# Patient Record
Sex: FEMALE | Race: OTHER | HISPANIC OR LATINO
[De-identification: names, ages, dates, MRNs, and addresses within clinical notes are randomized per-mention and may not be internally consistent; named-entity substitution may affect disease eponyms.]

---

## 2021-06-09 PROBLEM — Z00.00 ENCOUNTER FOR PREVENTIVE HEALTH EXAMINATION: Status: ACTIVE | Noted: 2021-06-09

## 2021-06-10 ENCOUNTER — ASOB RESULT (OUTPATIENT)
Age: 26
End: 2021-06-10

## 2021-06-10 ENCOUNTER — APPOINTMENT (OUTPATIENT)
Dept: ANTEPARTUM | Facility: CLINIC | Age: 26
End: 2021-06-10
Payer: MEDICAID

## 2021-06-10 PROCEDURE — 76815 OB US LIMITED FETUS(S): CPT

## 2021-07-22 ENCOUNTER — ASOB RESULT (OUTPATIENT)
Age: 26
End: 2021-07-22

## 2021-07-22 ENCOUNTER — APPOINTMENT (OUTPATIENT)
Dept: ANTEPARTUM | Facility: CLINIC | Age: 26
End: 2021-07-22

## 2021-08-17 ENCOUNTER — APPOINTMENT (OUTPATIENT)
Dept: ANTEPARTUM | Facility: CLINIC | Age: 26
End: 2021-08-17

## 2021-09-30 ENCOUNTER — APPOINTMENT (OUTPATIENT)
Dept: ANTEPARTUM | Facility: CLINIC | Age: 26
End: 2021-09-30
Payer: MEDICAID

## 2021-09-30 ENCOUNTER — ASOB RESULT (OUTPATIENT)
Age: 26
End: 2021-09-30

## 2021-09-30 PROBLEM — Z00.00 ENCOUNTER FOR PREVENTIVE HEALTH EXAMINATION: Noted: 2021-09-30

## 2021-09-30 PROCEDURE — 76819 FETAL BIOPHYS PROFIL W/O NST: CPT

## 2021-09-30 PROCEDURE — 76816 OB US FOLLOW-UP PER FETUS: CPT

## 2021-11-01 ENCOUNTER — OUTPATIENT (OUTPATIENT)
Dept: INPATIENT UNIT | Facility: HOSPITAL | Age: 26
LOS: 1 days | End: 2021-11-01
Payer: COMMERCIAL

## 2021-11-01 VITALS
RESPIRATION RATE: 16 BRPM | SYSTOLIC BLOOD PRESSURE: 104 MMHG | DIASTOLIC BLOOD PRESSURE: 60 MMHG | TEMPERATURE: 99 F | HEART RATE: 67 BPM

## 2021-11-01 VITALS — DIASTOLIC BLOOD PRESSURE: 55 MMHG | SYSTOLIC BLOOD PRESSURE: 98 MMHG | HEART RATE: 71 BPM

## 2021-11-01 DIAGNOSIS — O47.1 FALSE LABOR AT OR AFTER 37 COMPLETED WEEKS OF GESTATION: ICD-10-CM

## 2021-11-01 PROCEDURE — 59025 FETAL NON-STRESS TEST: CPT

## 2021-11-01 PROCEDURE — G0463: CPT

## 2021-11-01 RX ORDER — CITRIC ACID/SODIUM CITRATE 300-500 MG
30 SOLUTION, ORAL ORAL ONCE
Refills: 0 | Status: COMPLETED | OUTPATIENT
Start: 2021-11-01 | End: 2021-11-01

## 2021-11-01 RX ADMIN — Medication 30 MILLILITER(S): at 21:00

## 2021-11-01 NOTE — OB PROVIDER TRIAGE NOTE - NSHPPHYSICALEXAM_GEN_ALL_CORE
T(C): 37.1 (11-01-21 @ 19:47), Max: 37.1 (11-01-21 @ 19:47)  HR: 67 (11-01-21 @ 19:50) (67 - 67)  BP: 104/60 (11-01-21 @ 19:50) (104/60 - 104/60)  RR: 16 (11-01-21 @ 19:47) (16 - 16)    Gen: NAD, well-appearing, AAOx3   Abd: Soft, gravid  Ext: non-tender, non-edematous  SSE:   SVE:    Bedside sono:  FHT:  [place]: T(C): 37.1 (11-01-21 @ 19:47), Max: 37.1 (11-01-21 @ 19:47)  HR: 67 (11-01-21 @ 19:50) (67 - 67)  BP: 104/60 (11-01-21 @ 19:50) (104/60 - 104/60)  RR: 16 (11-01-21 @ 19:47) (16 - 16)    Gen: NAD, well-appearing, AAOx3   Abd: Soft, gravid  Ext: non-tender, non-edematous    SVE:  3/50/-3    FHT: baseline 140s, moderate variability, +accels, -decels   [place]: CTX irregularly

## 2021-11-01 NOTE — OB PROVIDER TRIAGE NOTE - NSOBPROVIDERNOTE_OBGYN_ALL_OB_FT
A/P: 26y  at 39 weeks 4 days GA by LMP who presents to L&D for 8/10 right upper quadrant and back pain.   -F/u UA and culture  -Fetus: Cat I tracing. Continuous toco and fetal monitoring.   -GBS: Positive    Discussed with  _ A/P: 26y  at 39 weeks 4 days GA by LMP who presents to L&D for RUQ pain.  -VSS  -Fetus: reactive  -CTX irregularly  -SVE: 3/50/-3. Will reexamine in 2 hours  -Bicitra  -GBS: Positive    Discussed with Dr. Roman

## 2021-11-01 NOTE — OB PROVIDER TRIAGE NOTE - HISTORY OF PRESENT ILLNESS
used for HPI.    26y  at 39 weeks 4 days GA by LMP who presents to L&D for 8/10 right upper quadrant and back pain. Patient states pain began this morning and has progressively gotten worse. The pain is associated with a strong urge to urinate, but when she goes to urinate she only produces a small amount of yellow urine. Patient denies vaginal bleeding, contractions and leakage of fluid. She does say she has some white and thick vaginal discharge. She endorses good fetal movement. Denies fevers, chills, nausea, vomiting, chest pain, SOB, dizziness and headache. No other complaints at this time.   VLAD: 21  Prenatal course uncomplicated.      POB: ;   G1:  at term 2018  G2:  at term 2019  PGYN: denies  PMH: Denies  PSH: Denies  SH: Denies EtOH, tobacco and illicit drug use during this pregnancy  Meds: PNVs  Allergies: NKDA  used for HPI.    26y  at 39 weeks 4 days GA by LMP who presents to L&D for 8/10 right upper quadrant and back pain. Patient states pain began this morning and has progressively gotten worse. The pain is associated with a strong urge to urinate, but when she goes to urinate she only produces a small amount of yellow urine. Patient denies vaginal bleeding, contractions and leakage of fluid. She does say she has some white and thick vaginal discharge. She endorses good fetal movement. Denies fevers, chills, dizziness and headache. Says she threw up once this morning. No other complaints at this time.   VLAD: 21  Prenatal course uncomplicated.      POB: ;   G1:  at term 2018  G2:  at term 2019  PGYN: denies  PMH: Denies  PSH: Denies  SH: Denies EtOH, tobacco and illicit drug use during this pregnancy  Meds: PNVs  Allergies: NKDA  used for HPI.    26y  at 39 weeks 4 days GA by LMP who presents to L&D for right upper quadrant and back pain. Patient rates the pain as 8/10 and states pain began this morning and has progressively gotten worse. She has not taken anything for the pain. Patient ate a fruit smoothie this morning, had an episode of emesis around noon and has not eaten since. Patient denies vaginal bleeding, contractions and leakage of fluid. She endorses good fetal movement. Denies fevers, chills, dizziness and headache. No other complaints at this time.     VLAD: 21    Prenatal course uncomplicated.      POB:   G1:  at term 2018  G2:  at term 2019  G3: current  PGYN: denies  PMH: Denies  PSH: Denies  SH: Denies EtOH, tobacco and illicit drug use during this pregnancy  Meds: PNVs  Allergies: NKDA

## 2021-11-05 RX ORDER — OXYTOCIN 10 UNIT/ML
333.33 VIAL (ML) INJECTION
Qty: 20 | Refills: 0 | Status: DISCONTINUED | OUTPATIENT
Start: 2021-11-06 | End: 2021-11-09

## 2021-11-05 RX ORDER — CITRIC ACID/SODIUM CITRATE 300-500 MG
30 SOLUTION, ORAL ORAL ONCE
Refills: 0 | Status: COMPLETED | OUTPATIENT
Start: 2021-11-06 | End: 2021-11-06

## 2021-11-05 RX ORDER — AMPICILLIN TRIHYDRATE 250 MG
1 CAPSULE ORAL EVERY 4 HOURS
Refills: 0 | Status: DISCONTINUED | OUTPATIENT
Start: 2021-11-06 | End: 2021-11-07

## 2021-11-05 RX ORDER — SODIUM CHLORIDE 9 MG/ML
1000 INJECTION, SOLUTION INTRAVENOUS
Refills: 0 | Status: DISCONTINUED | OUTPATIENT
Start: 2021-11-06 | End: 2021-11-07

## 2021-11-05 NOTE — OB PROVIDER H&P - ASSESSMENT
Patient is a 26 year old  at 40w2d by LMP who presents to L&D for IOL at term.   A/P: Patient is a 26 year old  at 40w2d by LMP who presents to L&D for IOL at term.    -Admit to L&D  -Consent  -Admission labs  -NPO, except ice chips   -IV fluids  -Labor: Intact. Not in labor. Will start induction of labor with pitocin.   -Fetus: Reactive NST. Continuous toco and fetal monitoring.   -GBS: Positive, GBS ppx required   -Analgesia: Will desire an epidural   -DVT ppx: Ambulate and SCD's while in bed   -Male fetus, declines circumcision     Discussed with Dr. Castillo

## 2021-11-05 NOTE — OB PROVIDER H&P - HISTORY OF PRESENT ILLNESS
Patient is a 26 year old  at 40w2d by LMP who presents to L&D for IOL at term.      VLAD: 21   LMP: 21      Pregnancy course:   Insufficient prenatal care   Breech presentation - resolved      Obhx:   G1 – 18 -  @ 40 wks, [id number]   G2 – 19 -  @ 40wks, [id number]   Gynhx: -fibroids, -cysts, -STIs, no abnormal PAPs   Pmhx: denies   Pshx: denies   Meds: PNV   Allergies: NKDA   SocialHx: denies smoking, drug or alcohol use       BMI: 29.68   Ultrasound: vertex, anterior on 21   EFW: 2563g on 21  Patient is a 26 year old  at 40w2d by LMP who presents to L&D for IOL at term. Patient denies vaginal bleeding, contractions and leakage of fluid. She endorses good fetal movement. Denies fevers, chills, nausea and vomiting. No other complaints at this time.   VLAD: 21   LMP: 21     Pregnancy course:   Insufficient prenatal care     Obhx:   G1 – 18 -  @ 40 wks, [id number]   G2 – 19 -  @ 40wks, [id number]   Gynhx: -fibroids, -cysts, -STIs, no abnormal PAPs   Pmhx: denies   Pshx: denies   Meds: PNV   Allergies: NKDA   SocialHx: denies smoking, drug or alcohol use       BMI: 29.68   Ultrasound: vertex, anterior on 21   EFW: 2563g on 21

## 2021-11-05 NOTE — OB PROVIDER H&P - ATTENDING COMMENTS
Message left that clinic called and patient needs to call the clinic ASAP.       Multip with IUP @ 19hah6d admitted for elective IOL at term, however already julee and in early labor. GBSpos. Maternal/fetal status reassuring    -admit  -routine admission orders  -AMP for GBS prophylaxis   -pitocin as gutierrez score is 6  -regular diet  -consents explained and signed  all questions and concerns answered  ppalos

## 2021-11-05 NOTE — OB PROVIDER H&P - NSHPPHYSICALEXAM_GEN_ALL_CORE
Vital Signs Last 24 Hrs  T(C): 36.7 (06 Nov 2021 09:05), Max: 36.7 (06 Nov 2021 09:05)  T(F): 98.1 (06 Nov 2021 09:05), Max: 98.1 (06 Nov 2021 09:05)  HR: 67 (06 Nov 2021 09:05) (67 - 67)  BP: 98/54 (06 Nov 2021 09:05) (98/54 - 98/54)  RR: 18 (06 Nov 2021 09:05) (18 - 18)    Gen: NAD  Abd: soft, non-tender, gravid   Ext: non-tender, non-edematous   SVE: 3.5/40/-3  Bedside sono: cephalic  FHT: baseline 140s, moderate variability, +accels, -decels   [place]: irregular contractions

## 2021-11-06 ENCOUNTER — INPATIENT (INPATIENT)
Facility: HOSPITAL | Age: 26
LOS: 2 days | Discharge: HOME | End: 2021-11-09
Attending: OBSTETRICS & GYNECOLOGY | Admitting: OBSTETRICS & GYNECOLOGY
Payer: COMMERCIAL

## 2021-11-06 ENCOUNTER — RESULT REVIEW (OUTPATIENT)
Age: 26
End: 2021-11-06

## 2021-11-06 VITALS — SYSTOLIC BLOOD PRESSURE: 98 MMHG | DIASTOLIC BLOOD PRESSURE: 54 MMHG | HEART RATE: 67 BPM

## 2021-11-06 LAB
ABO RH CONFIRMATION: SIGNIFICANT CHANGE UP
BASOPHILS # BLD AUTO: 0.03 K/UL — SIGNIFICANT CHANGE UP (ref 0–0.2)
BASOPHILS NFR BLD AUTO: 0.4 % — SIGNIFICANT CHANGE UP (ref 0–2)
BLD GP AB SCN SERPL QL: SIGNIFICANT CHANGE UP
COVID-19 SPIKE DOMAIN AB INTERP: POSITIVE
COVID-19 SPIKE DOMAIN ANTIBODY RESULT: 146 U/ML — HIGH
EOSINOPHIL # BLD AUTO: 0.05 K/UL — SIGNIFICANT CHANGE UP (ref 0–0.5)
EOSINOPHIL NFR BLD AUTO: 0.6 % — SIGNIFICANT CHANGE UP (ref 0–6)
HCT VFR BLD CALC: 33.1 % — LOW (ref 34.5–45)
HGB BLD-MCNC: 10.9 G/DL — LOW (ref 11.5–15.5)
IMM GRANULOCYTES NFR BLD AUTO: 1.7 % — HIGH (ref 0–1.5)
LYMPHOCYTES # BLD AUTO: 1.48 K/UL — SIGNIFICANT CHANGE UP (ref 1–3.3)
LYMPHOCYTES # BLD AUTO: 17.9 % — SIGNIFICANT CHANGE UP (ref 13–44)
MCHC RBC-ENTMCNC: 27.3 PG — SIGNIFICANT CHANGE UP (ref 27–34)
MCHC RBC-ENTMCNC: 32.9 GM/DL — SIGNIFICANT CHANGE UP (ref 32–36)
MCV RBC AUTO: 82.8 FL — SIGNIFICANT CHANGE UP (ref 80–100)
MONOCYTES # BLD AUTO: 0.5 K/UL — SIGNIFICANT CHANGE UP (ref 0–0.9)
MONOCYTES NFR BLD AUTO: 6.1 % — SIGNIFICANT CHANGE UP (ref 2–14)
NEUTROPHILS # BLD AUTO: 6.06 K/UL — SIGNIFICANT CHANGE UP (ref 1.8–7.4)
NEUTROPHILS NFR BLD AUTO: 73.3 % — SIGNIFICANT CHANGE UP (ref 43–77)
PLATELET # BLD AUTO: 208 K/UL — SIGNIFICANT CHANGE UP (ref 150–400)
RBC # BLD: 4 M/UL — SIGNIFICANT CHANGE UP (ref 3.8–5.2)
RBC # FLD: 13.6 % — SIGNIFICANT CHANGE UP (ref 10.3–14.5)
SARS-COV-2 IGG+IGM SERPL QL IA: 146 U/ML — HIGH
SARS-COV-2 IGG+IGM SERPL QL IA: POSITIVE
WBC # BLD: 8.26 K/UL — SIGNIFICANT CHANGE UP (ref 3.8–10.5)
WBC # FLD AUTO: 8.26 K/UL — SIGNIFICANT CHANGE UP (ref 3.8–10.5)

## 2021-11-06 RX ORDER — GENTAMICIN SULFATE 40 MG/ML
260 VIAL (ML) INJECTION EVERY 24 HOURS
Refills: 0 | Status: DISCONTINUED | OUTPATIENT
Start: 2021-11-06 | End: 2021-11-07

## 2021-11-06 RX ORDER — ACETAMINOPHEN 500 MG
1000 TABLET ORAL ONCE
Refills: 0 | Status: COMPLETED | OUTPATIENT
Start: 2021-11-06 | End: 2021-11-06

## 2021-11-06 RX ORDER — OXYTOCIN 10 UNIT/ML
2 VIAL (ML) INJECTION
Qty: 30 | Refills: 0 | Status: DISCONTINUED | OUTPATIENT
Start: 2021-11-06 | End: 2021-11-09

## 2021-11-06 RX ORDER — AMPICILLIN TRIHYDRATE 250 MG
2 CAPSULE ORAL EVERY 6 HOURS
Refills: 0 | Status: DISCONTINUED | OUTPATIENT
Start: 2021-11-06 | End: 2021-11-07

## 2021-11-06 RX ORDER — AMPICILLIN TRIHYDRATE 250 MG
2 CAPSULE ORAL ONCE
Refills: 0 | Status: COMPLETED | OUTPATIENT
Start: 2021-11-06 | End: 2021-11-06

## 2021-11-06 RX ORDER — ACETAMINOPHEN 500 MG
975 TABLET ORAL ONCE
Refills: 0 | Status: COMPLETED | OUTPATIENT
Start: 2021-11-06 | End: 2021-11-06

## 2021-11-06 RX ADMIN — Medication 975 MILLIGRAM(S): at 18:50

## 2021-11-06 RX ADMIN — Medication 108 GRAM(S): at 22:04

## 2021-11-06 RX ADMIN — Medication 108 GRAM(S): at 18:07

## 2021-11-06 RX ADMIN — Medication 108 GRAM(S): at 14:06

## 2021-11-06 RX ADMIN — Medication 216 GRAM(S): at 10:33

## 2021-11-06 RX ADMIN — Medication 2 MILLIUNIT(S)/MIN: at 11:09

## 2021-11-06 RX ADMIN — Medication 975 MILLIGRAM(S): at 17:50

## 2021-11-06 RX ADMIN — Medication 400 MILLIGRAM(S): at 23:45

## 2021-11-06 RX ADMIN — Medication 30 MILLILITER(S): at 16:16

## 2021-11-06 RX ADMIN — SODIUM CHLORIDE 125 MILLILITER(S): 9 INJECTION, SOLUTION INTRAVENOUS at 09:40

## 2021-11-06 NOTE — OB PROVIDER LABOR PROGRESS NOTE - ASSESSMENT
Patient admitted for elective induction of labor. Patient now with suspected chorioamnionitis given fever of 101.5F and malodorous mucopurulent discharge. Ampicillin increased to 2g, gentamicin and ofirmev ordered. Patient does not meet criteria for code sepsis. Cat 1 tracing. Continue pitocin.     D/W Dr. East 
Patient admitted for an elective induction of labor.   -VSS  -Cat 1 tracing   -Eliezer regularly   -Continue pitocin

## 2021-11-06 NOTE — OB PROVIDER LABOR PROGRESS NOTE - NS_SUBJECTIVE/OBJECTIVE_OBGYN_ALL_OB_FT
Patient seen and examined at bedside for fever of 101.5F.   Vital Signs Last 24 Hrs  T(C): 37.5 (06 Nov 2021 21:45), Max: 37.5 (06 Nov 2021 21:45)  T(F): 99.5 (06 Nov 2021 21:45), Max: 99.5 (06 Nov 2021 21:45)  HR: 90 (06 Nov 2021 23:35) (62 - 102)  BP: 107/54 (06 Nov 2021 23:34) (89/53 - 113/62)  RR: 14 (06 Nov 2021 21:45) (14 - 18)  SpO2: 98% (06 Nov 2021 23:35) (94% - 99%)

## 2021-11-06 NOTE — OB PROVIDER LABOR PROGRESS NOTE - NS_SUBJECTIVE/OBJECTIVE_OBGYN_ALL_OB_FT
Patient is having elective IOL  FHR: baseline 150, mod variability, +accels, -deccels  [place]: contractions q2-3 minutes    AROM, clear fluid    Plan  - will continue pitocin  d/w Dr. Castillo

## 2021-11-06 NOTE — OB PROVIDER LABOR PROGRESS NOTE - NS_OBIHIFHRDETAILS_OBGYN_ALL_OB_FT
baseline 150s, moderate variability, +accels, -decels
baseline 150s, moderate variability, +accels, -decels

## 2021-11-06 NOTE — OB RN DELIVERY SUMMARY - NSSELHIDDEN_OBGYN_ALL_OB_FT
[NS_DeliveryAttending1_OBGYN_ALL_OB_FT:[id number]],[NS_DeliveryAssist1_OBGYN_ALL_OB_FT:[id number]],[NS_DeliveryRN_OBGYN_ALL_OB_FT:[id number]]

## 2021-11-06 NOTE — OB RN DELIVERY SUMMARY - NS_SEPSISRSKCALC_OBGYN_ALL_OB_FT
EOS calculated successfully. EOS Risk Factor: 0.5/1000 live births ([facility] national incidence); GA=40w3d; Temp=99.5; ROM=8.433; GBS='Positive'; Antibiotics='GBS specific antibiotics > 2 hrs prior to birth'   EOS calculated successfully. EOS Risk Factor: 0.5/1000 live births ([facility] national incidence); GA=40w3d; Temp=101.5; ROM=8.433; GBS='Positive'; Antibiotics='GBS specific antibiotics > 2 hrs prior to birth'   EOS calculated successfully. EOS Risk Factor: 0.5/1000 live births ([facility] national incidence); GA=40w3d; Temp=101.5; ROM=8.433; GBS='Positive'; Antibiotics='Broad spectrum antibiotics > 4 hrs prior to birth'

## 2021-11-06 NOTE — OB PROVIDER LABOR PROGRESS NOTE - NS_SUBJECTIVE/OBJECTIVE_OBGYN_ALL_OB_FT
Patient seen and examined at bedside. She is doing well. No complaints at this time.   Vital Signs Last 24 Hrs  T(C): 37.1 (06 Nov 2021 20:33), Max: 37.2 (06 Nov 2021 19:33)  T(F): 98.8 (06 Nov 2021 20:33), Max: 99 (06 Nov 2021 19:33)  HR: 71 (06 Nov 2021 21:40) (62 - 93)  BP: 97/54 (06 Nov 2021 21:34) (89/53 - 109/59)  RR: 18 (06 Nov 2021 18:45) (17 - 18)  SpO2: 97% (06 Nov 2021 21:20) (94% - 98%)

## 2021-11-06 NOTE — OB PROVIDER LABOR PROGRESS NOTE - NS_SUBJECTIVE/OBJECTIVE_OBGYN_ALL_OB_FT
Patient is having an elective IOL  FHR: baseline 150bpm, mod variability, +accels, -deccels  [place]: contractions q 5 minutes    Plan  - will continue pitocin    d/w Dr. Castillo Patient is having an elective IOL  FHR: baseline 150bpm, mod variability, +accels, -deccels  [address] [place]: contractions q 5 minutes    Plan  - will continue pitocin    d/w Dr. Castillo    maternal/fetal status reassuring  ppalos

## 2021-11-07 ENCOUNTER — TRANSCRIPTION ENCOUNTER (OUTPATIENT)
Age: 26
End: 2021-11-07

## 2021-11-07 LAB
HCT VFR BLD CALC: 33.5 % — LOW (ref 34.5–45)
HGB BLD-MCNC: 10.8 G/DL — LOW (ref 11.5–15.5)
MCHC RBC-ENTMCNC: 26.9 PG — LOW (ref 27–34)
MCHC RBC-ENTMCNC: 32.2 GM/DL — SIGNIFICANT CHANGE UP (ref 32–36)
MCV RBC AUTO: 83.3 FL — SIGNIFICANT CHANGE UP (ref 80–100)
MEV IGG SER-ACNC: 21 AU/ML — SIGNIFICANT CHANGE UP
MEV IGG+IGM SER-IMP: POSITIVE — SIGNIFICANT CHANGE UP
PLATELET # BLD AUTO: 196 K/UL — SIGNIFICANT CHANGE UP (ref 150–400)
RBC # BLD: 4.02 M/UL — SIGNIFICANT CHANGE UP (ref 3.8–5.2)
RBC # FLD: 13.8 % — SIGNIFICANT CHANGE UP (ref 10.3–14.5)
T PALLIDUM AB TITR SER: NEGATIVE — SIGNIFICANT CHANGE UP
WBC # BLD: 21.89 K/UL — HIGH (ref 3.8–10.5)
WBC # FLD AUTO: 21.89 K/UL — HIGH (ref 3.8–10.5)

## 2021-11-07 PROCEDURE — 88307 TISSUE EXAM BY PATHOLOGIST: CPT | Mod: 26

## 2021-11-07 RX ORDER — BENZOCAINE 10 %
1 GEL (GRAM) MUCOUS MEMBRANE EVERY 6 HOURS
Refills: 0 | Status: DISCONTINUED | OUTPATIENT
Start: 2021-11-07 | End: 2021-11-09

## 2021-11-07 RX ORDER — OXYTOCIN 10 UNIT/ML
333.33 VIAL (ML) INJECTION
Qty: 20 | Refills: 0 | Status: DISCONTINUED | OUTPATIENT
Start: 2021-11-07 | End: 2021-11-09

## 2021-11-07 RX ORDER — SODIUM CHLORIDE 9 MG/ML
1000 INJECTION, SOLUTION INTRAVENOUS ONCE
Refills: 0 | Status: COMPLETED | OUTPATIENT
Start: 2021-11-07 | End: 2021-11-07

## 2021-11-07 RX ORDER — ACETAMINOPHEN 500 MG
975 TABLET ORAL
Refills: 0 | Status: DISCONTINUED | OUTPATIENT
Start: 2021-11-07 | End: 2021-11-09

## 2021-11-07 RX ORDER — LANOLIN
1 OINTMENT (GRAM) TOPICAL EVERY 6 HOURS
Refills: 0 | Status: DISCONTINUED | OUTPATIENT
Start: 2021-11-07 | End: 2021-11-09

## 2021-11-07 RX ORDER — SIMETHICONE 80 MG/1
80 TABLET, CHEWABLE ORAL EVERY 4 HOURS
Refills: 0 | Status: DISCONTINUED | OUTPATIENT
Start: 2021-11-07 | End: 2021-11-09

## 2021-11-07 RX ORDER — SODIUM CHLORIDE 9 MG/ML
3 INJECTION INTRAMUSCULAR; INTRAVENOUS; SUBCUTANEOUS EVERY 8 HOURS
Refills: 0 | Status: DISCONTINUED | OUTPATIENT
Start: 2021-11-07 | End: 2021-11-09

## 2021-11-07 RX ORDER — OXYCODONE HYDROCHLORIDE 5 MG/1
5 TABLET ORAL
Refills: 0 | Status: DISCONTINUED | OUTPATIENT
Start: 2021-11-07 | End: 2021-11-09

## 2021-11-07 RX ORDER — HYDROCORTISONE 1 %
1 OINTMENT (GRAM) TOPICAL EVERY 6 HOURS
Refills: 0 | Status: DISCONTINUED | OUTPATIENT
Start: 2021-11-07 | End: 2021-11-09

## 2021-11-07 RX ORDER — OXYCODONE HYDROCHLORIDE 5 MG/1
5 TABLET ORAL ONCE
Refills: 0 | Status: DISCONTINUED | OUTPATIENT
Start: 2021-11-07 | End: 2021-11-09

## 2021-11-07 RX ORDER — DIBUCAINE 1 %
1 OINTMENT (GRAM) RECTAL EVERY 6 HOURS
Refills: 0 | Status: DISCONTINUED | OUTPATIENT
Start: 2021-11-07 | End: 2021-11-09

## 2021-11-07 RX ORDER — PRAMOXINE HYDROCHLORIDE 150 MG/15G
1 AEROSOL, FOAM RECTAL EVERY 4 HOURS
Refills: 0 | Status: DISCONTINUED | OUTPATIENT
Start: 2021-11-07 | End: 2021-11-09

## 2021-11-07 RX ORDER — KETOROLAC TROMETHAMINE 30 MG/ML
30 SYRINGE (ML) INJECTION ONCE
Refills: 0 | Status: DISCONTINUED | OUTPATIENT
Start: 2021-11-07 | End: 2021-11-07

## 2021-11-07 RX ORDER — IBUPROFEN 200 MG
600 TABLET ORAL EVERY 6 HOURS
Refills: 0 | Status: COMPLETED | OUTPATIENT
Start: 2021-11-07 | End: 2022-10-06

## 2021-11-07 RX ORDER — IBUPROFEN 200 MG
600 TABLET ORAL EVERY 6 HOURS
Refills: 0 | Status: DISCONTINUED | OUTPATIENT
Start: 2021-11-07 | End: 2021-11-09

## 2021-11-07 RX ORDER — TETANUS TOXOID, REDUCED DIPHTHERIA TOXOID AND ACELLULAR PERTUSSIS VACCINE, ADSORBED 5; 2.5; 8; 8; 2.5 [IU]/.5ML; [IU]/.5ML; UG/.5ML; UG/.5ML; UG/.5ML
0.5 SUSPENSION INTRAMUSCULAR ONCE
Refills: 0 | Status: DISCONTINUED | OUTPATIENT
Start: 2021-11-07 | End: 2021-11-09

## 2021-11-07 RX ORDER — DIPHENHYDRAMINE HCL 50 MG
25 CAPSULE ORAL EVERY 6 HOURS
Refills: 0 | Status: DISCONTINUED | OUTPATIENT
Start: 2021-11-07 | End: 2021-11-09

## 2021-11-07 RX ORDER — AER TRAVELER 0.5 G/1
1 SOLUTION RECTAL; TOPICAL EVERY 4 HOURS
Refills: 0 | Status: DISCONTINUED | OUTPATIENT
Start: 2021-11-07 | End: 2021-11-09

## 2021-11-07 RX ORDER — MAGNESIUM HYDROXIDE 400 MG/1
30 TABLET, CHEWABLE ORAL
Refills: 0 | Status: DISCONTINUED | OUTPATIENT
Start: 2021-11-07 | End: 2021-11-09

## 2021-11-07 RX ADMIN — Medication 1 TABLET(S): at 11:37

## 2021-11-07 RX ADMIN — Medication 216 GRAM(S): at 01:02

## 2021-11-07 RX ADMIN — Medication 975 MILLIGRAM(S): at 09:10

## 2021-11-07 RX ADMIN — SODIUM CHLORIDE 1000 MILLILITER(S): 9 INJECTION, SOLUTION INTRAVENOUS at 01:30

## 2021-11-07 RX ADMIN — Medication 600 MILLIGRAM(S): at 12:02

## 2021-11-07 RX ADMIN — Medication 600 MILLIGRAM(S): at 11:37

## 2021-11-07 RX ADMIN — Medication 200 MILLIGRAM(S): at 00:18

## 2021-11-07 RX ADMIN — SODIUM CHLORIDE 3 MILLILITER(S): 9 INJECTION INTRAMUSCULAR; INTRAVENOUS; SUBCUTANEOUS at 06:59

## 2021-11-07 RX ADMIN — Medication 600 MILLIGRAM(S): at 19:07

## 2021-11-07 RX ADMIN — Medication 975 MILLIGRAM(S): at 15:32

## 2021-11-07 RX ADMIN — Medication 975 MILLIGRAM(S): at 16:30

## 2021-11-07 RX ADMIN — Medication 0.2 MILLIGRAM(S): at 03:24

## 2021-11-07 RX ADMIN — Medication 975 MILLIGRAM(S): at 10:00

## 2021-11-07 RX ADMIN — Medication 30 MILLIGRAM(S): at 06:14

## 2021-11-07 RX ADMIN — Medication 975 MILLIGRAM(S): at 22:13

## 2021-11-07 RX ADMIN — Medication 30 MILLIGRAM(S): at 05:59

## 2021-11-07 RX ADMIN — Medication 1000 MILLIUNIT(S)/MIN: at 02:13

## 2021-11-07 RX ADMIN — SODIUM CHLORIDE 3 MILLILITER(S): 9 INJECTION INTRAMUSCULAR; INTRAVENOUS; SUBCUTANEOUS at 19:07

## 2021-11-07 RX ADMIN — Medication 975 MILLIGRAM(S): at 23:00

## 2021-11-07 RX ADMIN — Medication 600 MILLIGRAM(S): at 18:16

## 2021-11-07 NOTE — OB PROVIDER DELIVERY SUMMARY - NSPROVIDERDELIVERYNOTE_OBGYN_ALL_OB_FT
Vaginal Delivery Summary    Procedure: Normal spontaneous vaginal delivery   Findings: Viable male infant delivered in cephalic presentation at 0108, placenta delivered at 0112.  Apgar scores 9/9.   Weight: 3950g  Lacerations: None  Repair: Not indicated   EBL: 96cc  Complications: Loose nuchal x1    Procedure:   Patient felt rectal pressure and was found to be fully dilated, +2 station. She pushed effectively. She delivered a viable male infant. A loose nuchal cord X 1 was reduced on delivery of the shoulders and delayed cord clamping was performed for 15 seconds. Placenta delivered intact and pitocin was started at the delivery of the anterior shoulder. Perineum and vagina were inspected, no lacerations present. Adequate hemostasis was obtained. Fundus was noted to be firm.

## 2021-11-07 NOTE — OB NEONATOLOGY/PEDIATRICIAN DELIVERY SUMMARY - NSPEDSNEONOTESA_OBGYN_ALL_OB_FT
Dr. East requested me to attend  at 40.2 weeks due to NRFHT with maternal temp 101.5F. The mother is 25y/o, , O+, GBS +, HIV, HBsAg, RPR are NR, RI  L & D: Vigorous, bulb suctioned and dried, APGAR 9 & 9, eos 0.88  Asst: full term appropriate for gestational age, BB,   Plan: observe in transition, if remain stable then admit to NBN

## 2021-11-08 LAB
ALBUMIN SERPL ELPH-MCNC: 3.7 G/DL — SIGNIFICANT CHANGE UP (ref 3.3–5.2)
ALP SERPL-CCNC: 320 U/L — HIGH (ref 40–120)
ALT FLD-CCNC: 49 U/L — HIGH
AMYLASE P1 CFR SERPL: 56 U/L — SIGNIFICANT CHANGE UP (ref 36–128)
ANION GAP SERPL CALC-SCNC: 14 MMOL/L — SIGNIFICANT CHANGE UP (ref 5–17)
AST SERPL-CCNC: 63 U/L — HIGH
BILIRUB SERPL-MCNC: 0.8 MG/DL — SIGNIFICANT CHANGE UP (ref 0.4–2)
BUN SERPL-MCNC: 6.7 MG/DL — LOW (ref 8–20)
CALCIUM SERPL-MCNC: 8.9 MG/DL — SIGNIFICANT CHANGE UP (ref 8.6–10.2)
CHLORIDE SERPL-SCNC: 102 MMOL/L — SIGNIFICANT CHANGE UP (ref 98–107)
CO2 SERPL-SCNC: 23 MMOL/L — SIGNIFICANT CHANGE UP (ref 22–29)
CREAT SERPL-MCNC: 0.45 MG/DL — LOW (ref 0.5–1.3)
GLUCOSE SERPL-MCNC: 105 MG/DL — HIGH (ref 70–99)
LIDOCAIN IGE QN: 24 U/L — SIGNIFICANT CHANGE UP (ref 22–51)
POTASSIUM SERPL-MCNC: 4 MMOL/L — SIGNIFICANT CHANGE UP (ref 3.5–5.3)
POTASSIUM SERPL-SCNC: 4 MMOL/L — SIGNIFICANT CHANGE UP (ref 3.5–5.3)
PROT SERPL-MCNC: 6.5 G/DL — LOW (ref 6.6–8.7)
SODIUM SERPL-SCNC: 139 MMOL/L — SIGNIFICANT CHANGE UP (ref 135–145)

## 2021-11-08 PROCEDURE — 76705 ECHO EXAM OF ABDOMEN: CPT | Mod: 26,RT

## 2021-11-08 RX ORDER — ACETAMINOPHEN 500 MG
3 TABLET ORAL
Qty: 0 | Refills: 0 | DISCHARGE
Start: 2021-11-08

## 2021-11-08 RX ORDER — IBUPROFEN 200 MG
1 TABLET ORAL
Qty: 0 | Refills: 0 | DISCHARGE
Start: 2021-11-08

## 2021-11-08 RX ADMIN — Medication 600 MILLIGRAM(S): at 17:49

## 2021-11-08 RX ADMIN — OXYCODONE HYDROCHLORIDE 5 MILLIGRAM(S): 5 TABLET ORAL at 14:40

## 2021-11-08 RX ADMIN — Medication 975 MILLIGRAM(S): at 16:00

## 2021-11-08 RX ADMIN — Medication 600 MILLIGRAM(S): at 01:30

## 2021-11-08 RX ADMIN — Medication 600 MILLIGRAM(S): at 12:30

## 2021-11-08 RX ADMIN — Medication 975 MILLIGRAM(S): at 09:39

## 2021-11-08 RX ADMIN — OXYCODONE HYDROCHLORIDE 5 MILLIGRAM(S): 5 TABLET ORAL at 13:40

## 2021-11-08 RX ADMIN — Medication 975 MILLIGRAM(S): at 08:40

## 2021-11-08 RX ADMIN — Medication 975 MILLIGRAM(S): at 03:45

## 2021-11-08 RX ADMIN — Medication 600 MILLIGRAM(S): at 06:45

## 2021-11-08 RX ADMIN — SIMETHICONE 80 MILLIGRAM(S): 80 TABLET, CHEWABLE ORAL at 14:10

## 2021-11-08 RX ADMIN — Medication 1 TABLET(S): at 11:36

## 2021-11-08 RX ADMIN — Medication 975 MILLIGRAM(S): at 21:01

## 2021-11-08 RX ADMIN — Medication 600 MILLIGRAM(S): at 05:58

## 2021-11-08 RX ADMIN — Medication 600 MILLIGRAM(S): at 11:37

## 2021-11-08 RX ADMIN — Medication 600 MILLIGRAM(S): at 18:45

## 2021-11-08 RX ADMIN — Medication 975 MILLIGRAM(S): at 15:05

## 2021-11-08 RX ADMIN — Medication 600 MILLIGRAM(S): at 00:40

## 2021-11-08 RX ADMIN — Medication 975 MILLIGRAM(S): at 20:29

## 2021-11-08 RX ADMIN — Medication 975 MILLIGRAM(S): at 03:02

## 2021-11-08 NOTE — PROGRESS NOTE ADULT - ASSESSMENT
QUEENIE ANDRADE is a 26y  now PPD#1 s/p normal spontaneous vaginal delivery @40w2d GA, c/b chorio s/p amp/gent/ofirmev, c/b pp atony s/p methergine IM x1.  A/P:    -Vital signs stable  -Hgb: 10.8 -> AM labs pending   -Voiding, tolerating PO  -Advance care as tolerated   -Continue routine postpartum care and education  -Healthy male infant, declines circumcision  -Dispo: Likely discharge today pending attending approval.

## 2021-11-08 NOTE — DISCHARGE NOTE OB - PROVIDER TOKENS
FREE:[LAST:[[facility]],PHONE:[phone]],FAX:[(   )    -]] FREE:[LAST:[[facility]PHONE:[phone]],FAX:[(   )    -],FOLLOWUP:[Routine]],PROVIDER:[TOKEN:[[id number]],FOLLOWUP:[1 week]]

## 2021-11-08 NOTE — CHART NOTE - NSCHARTNOTEFT_GEN_A_CORE
Patient reevaluated in setting of report of left thigh heaviness since this morning, making it uncomfortable ambulate to the bathroom and back      Patient reports that she feels that the leg is heavy, isolated to anterior thigh  She reports the feeling has improved since this morning when she first woke up    Vital Signs Last 24 Hrs  T(C): 37.3 (07 Nov 2021 05:41), Max: 38.6 (06 Nov 2021 23:38)  T(F): 99.1 (07 Nov 2021 05:41), Max: 101.5 (07 Nov 2021 00:35)  HR: 60 (07 Nov 2021 05:41) (56 - 102)  BP: 100/60 (07 Nov 2021 05:41) (89/53 - 127/70)  RR: 18 (07 Nov 2021 05:41) (14 - 18)  SpO2: 98% (07 Nov 2021 05:41) (94% - 100%)    General: no acute distress'  CV: RRR  Pulm: clear bilaterally to auscultation  Abd: nontender  Ext: no calf tenderness; normal ROM bilaterally low extremities; no motor and sensory deficits bilaterally; no tenderness elicited in anterior left thigh; able to mabulate but with hesitancy when putting pressure on left leg      A/P:  signs/symptoms not concerning of VTE  not requiring doppler evaluation at this time  subjectively improving  likely parasthesias secondary to hip flexion during vaginal delivery  will continue to monitor; reeval in AM unless any changes    discussed with attending Dr Dumont
26y  now PPD#1 s/p normal spontaneous vaginal delivery @40w2d GA, c/b chorio s/p amp/gent/ofirmev, c/b pp atony s/p methergine IM x1.  Patient evaluated at bedside after reports of acute onset severe RUQ pain.     S:   Patient reports severe colicky RUQ pain that radiates from the RUQ to the right flank and right back.   Reports that it started today after breakfast and worsened after lunch.  Reports she has gotten this type of pain twice before delivering and now this is her first time after delivering.   Reports some nausea, no vomiting, denies fevers, chills, or myalgia.   Denies dizziness, lightheadedness, SOB, palpitations, or fatigue at rest or while ambulating.   +flatus, no BM, voiding spontaneously and without difficulty   Ambulating without difficulty.   Denies lower abdominal pain worsening, mild to moderate. Reports minimal lochia.     ICU Vital Signs Last 24 Hrs  T(C): 36.6 (2021 13:58), Max: 36.7 (2021 05:55)  T(F): 97.9 (2021 13:58), Max: 98 (2021 05:55)  HR: 57 (2021 05:55) (57 - 57)  BP: 97/60 (2021 05:55) (97/60 - 97/60)  RR: 18 (2021 05:55) (18 - 18)  SpO2: 99% (2021 05:55) (99% - 99%)      Gen: minimal discomfort   Cardio: RRR  Lungs: CTAB   Abdomen: soft, nondistended, firm fundus at level of umbilicus with appropriate tenderness on palpation, RUQ moderate tenderness with +ferguson sign, no CVA tenderness on either side, no rebound, no guarding, no referred pain   Ext: nontender lower extremities bilaterally                           10.8   21.89 )-----------( 196      ( 2021 10:09 )             33.5       A: possible symptomatic cholelithiasis vs cholecystitis vs MSK   P:   - CMP, Amylase, Lipase ordered   - Gallbladder Sono ordered   - will f/u when labs result   - continue post partum analgesia regimen   - recommended low fat foods during rest of stay   - anticipated discharge tomorrow, pending baby clearance       d/w Dr. Conley
Patient complaining of N/V with eating  After reviewing RUQ sono and labs, suspect cholelithiasis.  Gen Surg consulted    d/w Dr. East
Patient seen and examined at bedside to assess bleeding. Patient intermittently boggy. Clots expressed from uterus.     Vital Signs Last 24 Hrs  T(C): 38.1 (07 Nov 2021 02:20), Max: 38.1 (07 Nov 2021 02:20)  T(F): 100.6 (07 Nov 2021 02:20), Max: 100.6 (07 Nov 2021 02:20)  HR: 77 (07 Nov 2021 03:15) (62 - 102)  BP: 105/65 (07 Nov 2021 03:04) (89/53 - 122/55)  RR: 14 (06 Nov 2021 21:45) (14 - 18)  SpO2: 97% (07 Nov 2021 03:10) (94% - 100%)    Methergine x1 to be given.     D/W Dr. East

## 2021-11-08 NOTE — DISCHARGE NOTE OB - PLAN OF CARE
1) Please take ibuprofen and/or Tylenol as needed for pain as prescribed.  2) Nothing in the vagina for 6 weeks (including no sex, no tampons, and no douching).  3) Please call your doctor for a follow up your postpartum appointment in 4 weeks.  4) Please continue taking vitamins postpartum. Take iron and colace for acute blood loss anemia.  5) Please call the office sooner if you have heavy vaginal bleeding, severe abdominal pain, or fever > 100.4F.  6) You may resume regular daily activity as tolerated Patient had RUQ pain, found to have cholelithiasis, pain well-controlled and not requiring immediate intervention as determined by surgery team. Patient can follow-up outpatient with surgery in 1-2 weeks.

## 2021-11-08 NOTE — PROGRESS NOTE ADULT - SUBJECTIVE AND OBJECTIVE BOX
QUEENIE ANDRADE is a 26y  now PPD#1 s/p normal spontaneous vaginal delivery @40w2d GA, c/b chorio s/p amp/gent/ofirmev, c/b pp atony s/p methergine IM x1.    S:  Int 167131  No acute events overnight.   The patient has no complaints.  Pain controlled with current treatment regimen.   She is ambulating without difficulty and tolerating PO.   + flatus/-BM/+ voiding   She endorses appropriate lochia, which is decreasing.   She is breastfeeding and providing formula to baby.  She denies fevers, chills, nausea and vomiting.   She denies lightheadedness, dizziness, palpitations, chest pain and SOB.     O:    T(C): 37 (21 @ 15:47), Max: 37 (21 @ 15:47)  HR: 67 (21 @ 15:47) (67 - 67)  BP: 100/66 (21 @ 15:47) (100/66 - 100/66)  RR: 18 (21 @ 15:47) (18 - 18)  SpO2: 98% (21 @ 15:47) (98% - 98%)    Gen: NAD, AOx3  Resp: breathing comfortably on RA  Abdomen:  Soft, non-tender, non-distended  Uterus:  Fundus firm below umbilicus  VE:  Lochia as expected  LE:  Non-tender and non-edematous                          10.8   21.89 )-----------( 196      ( 2021 10:09 )             33.5

## 2021-11-08 NOTE — DISCHARGE NOTE OB - CARE PROVIDER_API CALL
Richard,   Phone: [phone]  Fax: (   )    -  Follow Up Time:    [facility],   Phone: [phone]  Fax: (   )    -  Follow Up Time: Routine    Jorge Luis Ellis)  Surgery  [address] [place] [address], [address] [place] [address]  Phone: [phone]  Fax: [phone]  Follow Up Time: 1 week

## 2021-11-08 NOTE — DISCHARGE NOTE OB - PATIENT PORTAL LINK FT
You can access the FollowMyHealth Patient Portal offered by [facility] by registering at the following website: [email or web address]. By joining [employer]’s FollowMyHealth portal, you will also be able to view your health information using other applications (apps) compatible with our system.

## 2021-11-08 NOTE — PROGRESS NOTE ADULT - ATTENDING COMMENTS
PPD1  s/p    no complaints  denies SOB, dizziness  breast feeding   abd soft, Uterus firm , NT  ext no edema   plan   routine PP care  DC home today   birth control discussed, pt wants to use Paragard   PP precautions given

## 2021-11-08 NOTE — DISCHARGE NOTE OB - CARE PLAN
1 Principal Discharge DX:	Normal delivery  Assessment and plan of treatment:	1) Please take ibuprofen and/or Tylenol as needed for pain as prescribed.  2) Nothing in the vagina for 6 weeks (including no sex, no tampons, and no douching).  3) Please call your doctor for a follow up your postpartum appointment in 4 weeks.  4) Please continue taking vitamins postpartum. Take iron and colace for acute blood loss anemia.  5) Please call the office sooner if you have heavy vaginal bleeding, severe abdominal pain, or fever > 100.4F.  6) You may resume regular daily activity as tolerated   Principal Discharge DX:	Normal delivery  Assessment and plan of treatment:	1) Please take ibuprofen and/or Tylenol as needed for pain as prescribed.  2) Nothing in the vagina for 6 weeks (including no sex, no tampons, and no douching).  3) Please call your doctor for a follow up your postpartum appointment in 4 weeks.  4) Please continue taking vitamins postpartum. Take iron and colace for acute blood loss anemia.  5) Please call the office sooner if you have heavy vaginal bleeding, severe abdominal pain, or fever > 100.4F.  6) You may resume regular daily activity as tolerated  Secondary Diagnosis:	Cholelithiasis  Assessment and plan of treatment:	Patient had RUQ pain, found to have cholelithiasis, pain well-controlled and not requiring immediate intervention as determined by surgery team. Patient can follow-up outpatient with surgery in 1-2 weeks.

## 2021-11-08 NOTE — DISCHARGE NOTE OB - HOSPITAL COURSE
Patient underwent a normal spontaneous vaginal delivery. Intrapartum course complicated by chorioamnionitis, reiceved ampicillin, gentamycin and ofirmev. Intrapartum course also complicated by uterine atony, recieved IM Methergine x1.  Post-partum course was uncomplicated. Pain is well controlled with PRN medication. She has no difficulty with ambulation, voiding, or PO intake. Lab values and vital signs are within normal limits prior to discharge.  Patient underwent a normal spontaneous vaginal delivery. Intrapartum course complicated by chorioamnionitis, reiceved ampicillin, gentamycin and ofirmev. Intrapartum course also complicated by uterine atony, recieved IM Methergine x1.  Post-partum course was uncomplicated. Of note, patient found to be symptomatic for cholelithiasis but not requiring acute intervention as determined by surgery who was consulted. Pain is well controlled with PRN medication. She has no difficulty with ambulation, voiding, or PO intake. Lab values and vital signs are within normal limits prior to discharge.

## 2021-11-09 VITALS
HEART RATE: 85 BPM | RESPIRATION RATE: 16 BRPM | OXYGEN SATURATION: 97 % | DIASTOLIC BLOOD PRESSURE: 58 MMHG | SYSTOLIC BLOOD PRESSURE: 100 MMHG | TEMPERATURE: 98 F

## 2021-11-09 PROCEDURE — 76705 ECHO EXAM OF ABDOMEN: CPT

## 2021-11-09 PROCEDURE — 86901 BLOOD TYPING SEROLOGIC RH(D): CPT

## 2021-11-09 PROCEDURE — 36415 COLL VENOUS BLD VENIPUNCTURE: CPT

## 2021-11-09 PROCEDURE — 85027 COMPLETE CBC AUTOMATED: CPT

## 2021-11-09 PROCEDURE — 86850 RBC ANTIBODY SCREEN: CPT

## 2021-11-09 PROCEDURE — 86900 BLOOD TYPING SEROLOGIC ABO: CPT

## 2021-11-09 PROCEDURE — 86765 RUBEOLA ANTIBODY: CPT

## 2021-11-09 PROCEDURE — 99222 1ST HOSP IP/OBS MODERATE 55: CPT

## 2021-11-09 PROCEDURE — 85025 COMPLETE CBC W/AUTO DIFF WBC: CPT

## 2021-11-09 PROCEDURE — 80053 COMPREHEN METABOLIC PANEL: CPT

## 2021-11-09 PROCEDURE — 86780 TREPONEMA PALLIDUM: CPT

## 2021-11-09 PROCEDURE — 86769 SARS-COV-2 COVID-19 ANTIBODY: CPT

## 2021-11-09 PROCEDURE — G0463: CPT

## 2021-11-09 PROCEDURE — 82150 ASSAY OF AMYLASE: CPT

## 2021-11-09 PROCEDURE — 83690 ASSAY OF LIPASE: CPT

## 2021-11-09 PROCEDURE — 59025 FETAL NON-STRESS TEST: CPT

## 2021-11-09 PROCEDURE — 59050 FETAL MONITOR W/REPORT: CPT

## 2021-11-09 PROCEDURE — 88307 TISSUE EXAM BY PATHOLOGIST: CPT

## 2021-11-09 RX ADMIN — Medication 975 MILLIGRAM(S): at 04:15

## 2021-11-09 RX ADMIN — Medication 975 MILLIGRAM(S): at 09:42

## 2021-11-09 RX ADMIN — Medication 975 MILLIGRAM(S): at 09:09

## 2021-11-09 RX ADMIN — Medication 600 MILLIGRAM(S): at 06:30

## 2021-11-09 RX ADMIN — Medication 975 MILLIGRAM(S): at 03:32

## 2021-11-09 RX ADMIN — Medication 600 MILLIGRAM(S): at 12:26

## 2021-11-09 RX ADMIN — Medication 600 MILLIGRAM(S): at 05:54

## 2021-11-09 RX ADMIN — Medication 1 TABLET(S): at 12:01

## 2021-11-09 RX ADMIN — Medication 600 MILLIGRAM(S): at 01:30

## 2021-11-09 RX ADMIN — Medication 600 MILLIGRAM(S): at 00:54

## 2021-11-09 RX ADMIN — Medication 600 MILLIGRAM(S): at 12:01

## 2021-11-09 NOTE — CONSULT NOTE ADULT - SUBJECTIVE AND OBJECTIVE BOX
ACUTE CARE SURGERY CONSULT    Time Consult Called:  Time Consult Seen:    General HPI:    27 yo  female who was admitted to the hospital on 21 for induction of labor. She delivered a healthy infant on 21. General surgery is being consulted because the patient is reporting right upper quadrant abdominal pain. Patient states that she experienced an episode of abdominal pain with nausea and vomiting 1 week prior to her delivery on 21. She states that she has not experienced similar symptoms in the past before that episode. She denies any fevers or chills.     In the ED, labs were notable for a WBC of 21. Abdominal ultrasound showed gallstones in a distended gallbladder with a markedly dilated common duct.General surgery was consulted for further management.     PAST MEDICAL HISTORY:  No pertinent past medical history    No pertinent past medical history        PAST SURGICAL HISTORY:      ALLERGIES:  No Known Allergies      FAMILY HISTORY: Noncontributory    SOCIAL HISTORY: Denies tobacco, EtOH, illicit substance use.     HOME MEDICATIONS:    MEDICATIONS  (STANDING):  acetaminophen     Tablet .. 975 milliGRAM(s) Oral <User Schedule>  diphtheria/tetanus/pertussis (acellular) Vaccine (ADAcel) 0.5 milliLiter(s) IntraMuscular once  ibuprofen  Tablet. 600 milliGRAM(s) Oral every 6 hours  oxytocin Infusion 333.333 milliUNIT(s)/Min (1000 mL/Hr) IV Continuous <Continuous>  oxytocin Infusion 333.333 milliUNIT(s)/Min (1000 mL/Hr) IV Continuous <Continuous>  oxytocin Infusion. 2 milliUNIT(s)/Min (2 mL/Hr) IV Continuous <Continuous>  oxytocin Infusion. 2 milliUNIT(s)/Min (2 mL/Hr) IV Continuous <Continuous>  prenatal multivitamin 1 Tablet(s) Oral daily  sodium chloride 0.9% lock flush 3 milliLiter(s) IV Push every 8 hours    MEDICATIONS  (PRN):  benzocaine 20%/menthol 0.5% Spray 1 Spray(s) Topical every 6 hours PRN for Perineal discomfort  dibucaine 1% Ointment 1 Application(s) Topical every 6 hours PRN Perineal discomfort  diphenhydrAMINE 25 milliGRAM(s) Oral every 6 hours PRN Pruritus  hydrocortisone 1% Cream 1 Application(s) Topical every 6 hours PRN Moderate Pain (4-6)  lanolin Ointment 1 Application(s) Topical every 6 hours PRN nipple soreness  magnesium hydroxide Suspension 30 milliLiter(s) Oral two times a day PRN Constipation  oxyCODONE    IR 5 milliGRAM(s) Oral every 3 hours PRN Moderate to Severe Pain (4-10)  oxyCODONE    IR 5 milliGRAM(s) Oral once PRN Moderate to Severe Pain (4-10)  pramoxine 1%/zinc 5% Cream 1 Application(s) Topical every 4 hours PRN Moderate Pain (4-6)  simethicone 80 milliGRAM(s) Chew every 4 hours PRN Gas  witch hazel Pads 1 Application(s) Topical every 4 hours PRN Perineal discomfort      VITALS & I/Os:  Vital Signs Last 24 Hrs  T(C): 36.6 (2021 04:06), Max: 36.6 (2021 13:58)  T(F): 97.9 (2021 04:06), Max: 97.9 (2021 13:58)  HR: 85 (2021 04:06) (60 - 85)  BP: 100/58 (2021 04:06) (100/58 - 104/64)  BP(mean): --  RR: 16 (2021 04:06) (16 - 17)  SpO2: 97% (2021 04:06) (97% - 98%)  CAPILLARY BLOOD GLUCOSE          I&O's Summary      GENERAL: Alert, well developed, in no acute distress.  RESPIRATORY: Nonlabored on RA  CARDIOVASCULAR: RRR.   GASTROINTESTINAL: Tender to palpation on RUQ  MUSCULOSKELETAL: No cyanosis or clubbing. No gross deformities.     LABS:                        10.8   21.89 )-----------( 196      ( 2021 10:09 )             33.5     11-08    139  |  102  |  6.7<L>  ----------------------------<  105<H>  4.0   |  23.0  |  0.45<L>    Ca    8.9      2021 18:57    TPro  6.5<L>  /  Alb  3.7  /  TBili  0.8  /  DBili  x   /  AST  63<H>  /  ALT  49<H>  /  AlkPhos  320<H>  11-08    Lactate: acetaminophen     Tablet .. 975 milliGRAM(s) Oral <User Schedule>  benzocaine 20%/menthol 0.5% Spray 1 Spray(s) Topical every 6 hours PRN  dibucaine 1% Ointment 1 Application(s) Topical every 6 hours PRN  diphenhydrAMINE 25 milliGRAM(s) Oral every 6 hours PRN  diphtheria/tetanus/pertussis (acellular) Vaccine (ADAcel) 0.5 milliLiter(s) IntraMuscular once  hydrocortisone 1% Cream 1 Application(s) Topical every 6 hours PRN  ibuprofen  Tablet. 600 milliGRAM(s) Oral every 6 hours  lanolin Ointment 1 Application(s) Topical every 6 hours PRN  magnesium hydroxide Suspension 30 milliLiter(s) Oral two times a day PRN  oxyCODONE    IR 5 milliGRAM(s) Oral every 3 hours PRN  oxyCODONE    IR 5 milliGRAM(s) Oral once PRN  oxytocin Infusion 333.333 milliUNIT(s)/Min IV Continuous <Continuous>  oxytocin Infusion 333.333 milliUNIT(s)/Min IV Continuous <Continuous>  oxytocin Infusion. 2 milliUNIT(s)/Min IV Continuous <Continuous>  oxytocin Infusion. 2 milliUNIT(s)/Min IV Continuous <Continuous>  pramoxine 1%/zinc 5% Cream 1 Application(s) Topical every 4 hours PRN  prenatal multivitamin 1 Tablet(s) Oral daily  simethicone 80 milliGRAM(s) Chew every 4 hours PRN  sodium chloride 0.9% lock flush 3 milliLiter(s) IV Push every 8 hours  witch hazel Pads 1 Application(s) Topical every 4 hours PRN       IMAGING:       EXAM:  US ABDOMEN RT UPR QUADRANT                          PROCEDURE DATE:  2021          INTERPRETATION:  CLINICAL INFORMATION: 26 years  Female with RUQ pain    r/o cholelithiasis v cystitis.    COMPARISON: None available.    TECHNIQUE: Sonography of the right upper quadrant.    FINDINGS:    Liver: Within normal limits. 18.5 cm sagittal  Bile ducts: Abnormally distended common duct 17 mm.  Gallbladder: Multiple gallstones. Distended gallbladder measuring 11.3 cm sagittal by 4.0 cm diameter. No wall thickening or pericholecystic fluid.  Pancreas: Obscured by bowel gas.  Right kidney: 11.6 cm. No hydronephrosis.  Ascites: Small perihepatic ascites.  IVC: Visualized portions are within normal limits.    IMPRESSION:    Gallstones in distended gallbladder. Markedly dilated common duct. Recommend MRCP to evaluate for choledocholithiasis.    Mild hepatomegaly.    Findings were discussed with Dr. Diane Jones 2021 8:41 PM by Dr. Eliane Bloom with read back confirmation.    --- End of Report ---            ELIANE BLOOM MD; Attending Radiologist  This document has been electronically signed. 2021  8:41PM

## 2021-11-09 NOTE — PROGRESS NOTE ADULT - SUBJECTIVE AND OBJECTIVE BOX
QUEENIE ANDRADE is a 26y  now PPD#2 s/p spontaneous vaginal delivery at 40.2 weeks gestation, complicated with chorioamnionitis s/p amp/gent and uterine atony s/p Methergine x 1.    S:    No acute events overnight.   The patient has no complaints.  Pain controlled with current treatment regimen.   She is ambulating without difficulty and tolerating PO.   + flatus/-BM/+ voiding   She endorses appropriate lochia, which is decreasing.   She is breastfeeding without difficulty.   She denies fevers, chills, nausea and vomiting.   She denies lightheadedness, dizziness, palpitations, chest pain and SOB.     O:    T(C): 36.6 (21 @ 04:06), Max: 36.6 (21 @ 13:58)  HR: 85 (21 @ 04:06) (60 - 85)  BP: 100/58 (21 @ 04:06) (100/58 - 104/64)  RR: 16 (21 @ 04:06) (16 - 17)  SpO2: 97% (21 @ 04:06) (97% - 98%)    Gen: NAD, AOx3  Pulm: Speaking in full sentences without difficulty breathing  Abdomen:  Soft, non-tender, non-distended  Uterus:  Fundus firm below umbilicus  VE:  Expected lochia  Ext:  Bilateral lower extremities non-tender and non-edematous                          10.8   21.89 )-----------( 196      ( 2021 10:09 )             33.5         139  |  102  |  6.7<L>  ----------------------------<  105<H>  4.0   |  23.0  |  0.45<L>    Ca    8.9      2021 18:57    TPro  6.5<L>  /  Alb  3.7  /  TBili  0.8  /  DBili  x   /  AST  63<H>  /  ALT  49<H>  /  AlkPhos  320<H>           QUEENIE ANDRADE is a 26y  now PPD#2 s/p spontaneous vaginal delivery at 40.2 weeks gestation, complicated with chorioamnionitis s/p amp/gent and uterine atony s/p Methergine x 1.    S:    No acute events overnight.   The patient has no complaints. She states that her RUQ pain is significantly improved.  Pain controlled with current treatment regimen.   She is ambulating without difficulty and tolerating PO.   + flatus/-BM/+ voiding   She endorses appropriate lochia, which is decreasing.   She is breastfeeding and providing formula to baby.   She denies fevers, chills, nausea and vomiting.   She denies lightheadedness, dizziness, palpitations, chest pain and SOB.     O:    T(C): 36.6 (21 @ 04:06), Max: 36.6 (21 @ 13:58)  HR: 85 (21 @ 04:06) (60 - 85)  BP: 100/58 (21 @ 04:06) (100/58 - 104/64)  RR: 16 (21 @ 04:06) (16 - 17)  SpO2: 97% (21 @ 04:06) (97% - 98%)    Gen: NAD, AOx3  Pulm: Speaking in full sentences without difficulty breathing  Abdomen:  Soft, non-tender, non-distended  Uterus:  Fundus firm below umbilicus  VE:  Expected lochia  Ext:  Bilateral lower extremities non-tender and non-edematous                          10.8   21.89 )-----------( 196      ( 2021 10:09 )             33.5         139  |  102  |  6.7<L>  ----------------------------<  105<H>  4.0   |  23.0  |  0.45<L>    Ca    8.9      2021 18:57    TPro  6.5<L>  /  Alb  3.7  /  TBili  0.8  /  DBili  x   /  AST  63<H>  /  ALT  49<H>  /  AlkPhos  320<H>        < from: US Abdomen Upper Quadrant Right (21 @ 18:22) >  IMPRESSION:    Gallstones in distended gallbladder. Markedly dilated common duct. Recommend MRCP to evaluate for choledocholithiasis.    Mild hepatomegaly.    < end of copied text >

## 2021-11-09 NOTE — CONSULT NOTE ADULT - ASSESSMENT
25 yo  female who was admitted to the hospital on 21 for induction of labor. She delivered a healthy infant on 21. General surgery is being consulted because the patient is reporting right upper quadrant abdominal pain. Patient states that she experienced an episode of abdominal pain with nausea and vomiting 1 week prior to her delivery on 21. She states that she has not experienced similar symptoms in the past before that episode. She denies any fevers or chills.     In the ED, labs were notable for a WBC of 21. Abdominal ultrasound showed gallstones in a distended gallbladder with a markedly dilated common duct. General surgery was consulted for further management.     - General surgery will follow patient while she is admitted  - Please see attending attestation for final plan    Discussed with Dr. Rhonda King MD PGY3

## 2021-11-09 NOTE — PROGRESS NOTE ADULT - ASSESSMENT
QUEENIE ANDRADE is a 26y  now PPD#2 s/p spontaneous vaginal delivery at 40.2 weeks gestation, complicated with chorioamnionitis s/p amp/gent and uterine atony s/p Methergine x 1.    A/P:    -Vital signs stable  -Hgb: 10.9->10.8   -Voiding, tolerating PO, bowel function nml   -Advance care as tolerated   -Continue routine postpartum care and education  -Healthy male infant, declines circumcision  -DVT ppx: Ambulation encouraged. SCDs while in bed.   -Dispo: Anticipate discharge to home today pending attending approval.   QUEENIE ANDRADE is a 26y  now PPD#2 s/p spontaneous vaginal delivery at 40.2 weeks gestation, complicated with chorioamnionitis s/p amp/gent and uterine atony s/p Methergine x 1.    A/P:    -Vital signs stable  -Hgb: 10.9->10.8   -US read appreciated, surgery consult pending  -Voiding, tolerating PO  -Advance care as tolerated   -Continue routine postpartum care and education  -Healthy male infant, declines circumcision  -DVT ppx: Ambulation encouraged. SCDs while in bed.   -Dispo: Anticipate discharge to home today pending attending approval.

## 2021-11-17 LAB — SURGICAL PATHOLOGY STUDY: SIGNIFICANT CHANGE UP

## 2022-09-29 ENCOUNTER — APPOINTMENT (OUTPATIENT)
Dept: MATERNAL FETAL MEDICINE | Facility: CLINIC | Age: 27
End: 2022-09-29

## 2022-09-29 ENCOUNTER — ASOB RESULT (OUTPATIENT)
Age: 27
End: 2022-09-29

## 2022-09-29 ENCOUNTER — APPOINTMENT (OUTPATIENT)
Dept: ANTEPARTUM | Facility: CLINIC | Age: 27
End: 2022-09-29

## 2022-09-29 PROCEDURE — 76805 OB US >/= 14 WKS SNGL FETUS: CPT

## 2022-09-29 PROCEDURE — 76817 TRANSVAGINAL US OBSTETRIC: CPT

## 2022-09-29 PROCEDURE — 99203 OFFICE O/P NEW LOW 30 MIN: CPT | Mod: TH

## 2022-10-20 NOTE — OB RN PATIENT PROFILE - ALERT: PERTINENT HISTORY
10/20/2022      RE: Karely Batres  [address]  [place] [address]     Dear Colleague,    Thank you for the opportunity to participate in the care of your patient, Karely Batres, at the [facility] at [facility]. Please see a copy of my visit note below.    Michael Hanson MD   [facility] [address], [address] [place] [id number]    RE:      Karely Batres  MRN:  [id number]  :   2005    Dear Dr. Hanson:    It was a pleasure to see your patient, Karely Batres, here at the [facility] for followup after his recent excision of a pilonidal cyst cavity with primary closure and loop drainage.    As you recall, Karely is a 16-year-old male who has struggled with a persistent pilonidal cyst disease for a little over a year.  Initially, had a large infected abscess which was drained.  He then has undergone some debridements with near healing and ultimately underwent cyst excision.    He returns to clinic today stating he feels well.  He is having just a couple of small little dots of drainage at the exit sites of his red vessel loop that is down through the cavity.  His glue has flaked off.  He has not had any drainage from the wound.  He has not had any fevers.  He feels well and the previous discomfort that he had in the area has completely resolved.    PHYSICAL EXAMINATION:  The wound is healing nicely.  The skin is nicely opposed and closed.  There are 2 little dots where the drain is coming through.  There is no active drainage.  There is no surrounding erythema or inflammation and he is completely nontender throughout the entire area.    In summary, Karely appears to be healing well from his excision of his pilonidal cyst and primary closure.  He is very anxious to get the vessel loop out.  I do believe it is slightly early and I would  like to have at least another week or perhaps 10 days.  They state they are very comfortable removing the drain and we talked them through that procedure.  I am comfortable with Karely removing it with his mother's assistance.  They know to do it after a shower with a clean pair of scissors and cut the drain in one spot and pull it through.    Certainly, happy to see him back as ongoing healing or if he has any issues but hopefully this will allow him to get over his pilonidal cyst disease.    Again, thank you very much for allowing us to participate in his care.  If you need any further assistance to you or Karely, please do not hesitate to ask.    Sincerely,    Abram Wong MD     BioPhysical Profile(s)

## 2022-11-13 ENCOUNTER — INPATIENT (INPATIENT)
Facility: HOSPITAL | Age: 27
LOS: 1 days | Discharge: HOME | End: 2022-11-15
Attending: OBSTETRICS & GYNECOLOGY | Admitting: OBSTETRICS & GYNECOLOGY
Payer: COMMERCIAL

## 2022-11-13 VITALS
DIASTOLIC BLOOD PRESSURE: 64 MMHG | HEART RATE: 66 BPM | TEMPERATURE: 98 F | SYSTOLIC BLOOD PRESSURE: 105 MMHG | RESPIRATION RATE: 20 BRPM

## 2022-11-13 DIAGNOSIS — O47.1 FALSE LABOR AT OR AFTER 37 COMPLETED WEEKS OF GESTATION: ICD-10-CM

## 2022-11-13 NOTE — OB RN TRIAGE NOTE - FALL HARM RISK - UNIVERSAL INTERVENTIONS
Bed in lowest position, wheels locked, appropriate side rails in place/Call bell, personal items and telephone in reach/Instruct patient to call for assistance before getting out of bed or chair/Non-slip footwear when patient is out of bed/[place] to call system/Physically safe environment - no spills, clutter or unnecessary equipment/Purposeful Proactive Rounding/Room/bathroom lighting operational, light cord in reach

## 2022-11-14 ENCOUNTER — TRANSCRIPTION ENCOUNTER (OUTPATIENT)
Age: 27
End: 2022-11-14

## 2022-11-14 ENCOUNTER — RESULT REVIEW (OUTPATIENT)
Age: 27
End: 2022-11-14

## 2022-11-14 DIAGNOSIS — O26.893 OTHER SPECIFIED PREGNANCY RELATED CONDITIONS, THIRD TRIMESTER: ICD-10-CM

## 2022-11-14 LAB
BASOPHILS # BLD AUTO: 0 K/UL — SIGNIFICANT CHANGE UP (ref 0–0.2)
BASOPHILS NFR BLD AUTO: 0 % — SIGNIFICANT CHANGE UP (ref 0–2)
BLD GP AB SCN SERPL QL: SIGNIFICANT CHANGE UP
COVID-19 SPIKE DOMAIN AB INTERP: POSITIVE
COVID-19 SPIKE DOMAIN ANTIBODY RESULT: >250 U/ML — HIGH
EOSINOPHIL # BLD AUTO: 0.11 K/UL — SIGNIFICANT CHANGE UP (ref 0–0.5)
EOSINOPHIL NFR BLD AUTO: 0.9 % — SIGNIFICANT CHANGE UP (ref 0–6)
GIANT PLATELETS BLD QL SMEAR: PRESENT — SIGNIFICANT CHANGE UP
HCT VFR BLD CALC: 32.4 % — LOW (ref 34.5–45)
HGB BLD-MCNC: 10.9 G/DL — LOW (ref 11.5–15.5)
LYMPHOCYTES # BLD AUTO: 1.76 K/UL — SIGNIFICANT CHANGE UP (ref 1–3.3)
LYMPHOCYTES # BLD AUTO: 14.9 % — SIGNIFICANT CHANGE UP (ref 13–44)
MANUAL SMEAR VERIFICATION: SIGNIFICANT CHANGE UP
MCHC RBC-ENTMCNC: 27 PG — SIGNIFICANT CHANGE UP (ref 27–34)
MCHC RBC-ENTMCNC: 33.6 GM/DL — SIGNIFICANT CHANGE UP (ref 32–36)
MCV RBC AUTO: 80.2 FL — SIGNIFICANT CHANGE UP (ref 80–100)
MONOCYTES # BLD AUTO: 0.83 K/UL — SIGNIFICANT CHANGE UP (ref 0–0.9)
MONOCYTES NFR BLD AUTO: 7 % — SIGNIFICANT CHANGE UP (ref 2–14)
MYELOCYTES NFR BLD: 0.9 % — HIGH (ref 0–0)
NEUTROPHILS # BLD AUTO: 8.72 K/UL — HIGH (ref 1.8–7.4)
NEUTROPHILS NFR BLD AUTO: 72.8 % — SIGNIFICANT CHANGE UP (ref 43–77)
NEUTS BAND # BLD: 0.9 % — SIGNIFICANT CHANGE UP (ref 0–8)
PLAT MORPH BLD: NORMAL — SIGNIFICANT CHANGE UP
PLATELET # BLD AUTO: 177 K/UL — SIGNIFICANT CHANGE UP (ref 150–400)
RBC # BLD: 4.04 M/UL — SIGNIFICANT CHANGE UP (ref 3.8–5.2)
RBC # FLD: 15.9 % — HIGH (ref 10.3–14.5)
RBC BLD AUTO: NORMAL — SIGNIFICANT CHANGE UP
SARS-COV-2 IGG+IGM SERPL QL IA: >250 U/ML — HIGH
SARS-COV-2 IGG+IGM SERPL QL IA: POSITIVE
SARS-COV-2 RNA SPEC QL NAA+PROBE: SIGNIFICANT CHANGE UP
T PALLIDUM AB TITR SER: NEGATIVE — SIGNIFICANT CHANGE UP
VARIANT LYMPHS # BLD: 2.6 % — SIGNIFICANT CHANGE UP (ref 0–6)
WBC # BLD: 11.83 K/UL — HIGH (ref 3.8–10.5)
WBC # FLD AUTO: 11.83 K/UL — HIGH (ref 3.8–10.5)

## 2022-11-14 PROCEDURE — 88307 TISSUE EXAM BY PATHOLOGIST: CPT | Mod: 26

## 2022-11-14 RX ORDER — DIBUCAINE 1 %
1 OINTMENT (GRAM) RECTAL EVERY 6 HOURS
Refills: 0 | Status: DISCONTINUED | OUTPATIENT
Start: 2022-11-14 | End: 2022-11-15

## 2022-11-14 RX ORDER — ETONOGESTREL 68 MG/1
68 IMPLANT SUBCUTANEOUS ONCE
Refills: 0 | Status: COMPLETED | OUTPATIENT
Start: 2022-11-14 | End: 2022-11-14

## 2022-11-14 RX ORDER — KETOROLAC TROMETHAMINE 30 MG/ML
30 SYRINGE (ML) INJECTION ONCE
Refills: 0 | Status: DISCONTINUED | OUTPATIENT
Start: 2022-11-14 | End: 2022-11-14

## 2022-11-14 RX ORDER — CHLORHEXIDINE GLUCONATE 213 G/1000ML
1 SOLUTION TOPICAL ONCE
Refills: 0 | Status: DISCONTINUED | OUTPATIENT
Start: 2022-11-14 | End: 2022-11-14

## 2022-11-14 RX ORDER — TETANUS TOXOID, REDUCED DIPHTHERIA TOXOID AND ACELLULAR PERTUSSIS VACCINE, ADSORBED 5; 2.5; 8; 8; 2.5 [IU]/.5ML; [IU]/.5ML; UG/.5ML; UG/.5ML; UG/.5ML
0.5 SUSPENSION INTRAMUSCULAR ONCE
Refills: 0 | Status: COMPLETED | OUTPATIENT
Start: 2022-11-14

## 2022-11-14 RX ORDER — MAGNESIUM HYDROXIDE 400 MG/1
30 TABLET, CHEWABLE ORAL
Refills: 0 | Status: DISCONTINUED | OUTPATIENT
Start: 2022-11-14 | End: 2022-11-15

## 2022-11-14 RX ORDER — OXYCODONE HYDROCHLORIDE 5 MG/1
5 TABLET ORAL
Refills: 0 | Status: DISCONTINUED | OUTPATIENT
Start: 2022-11-14 | End: 2022-11-15

## 2022-11-14 RX ORDER — PRAMOXINE HYDROCHLORIDE 150 MG/15G
1 AEROSOL, FOAM RECTAL EVERY 4 HOURS
Refills: 0 | Status: DISCONTINUED | OUTPATIENT
Start: 2022-11-14 | End: 2022-11-15

## 2022-11-14 RX ORDER — HYDROCORTISONE 1 %
1 OINTMENT (GRAM) TOPICAL EVERY 6 HOURS
Refills: 0 | Status: DISCONTINUED | OUTPATIENT
Start: 2022-11-14 | End: 2022-11-15

## 2022-11-14 RX ORDER — SODIUM CHLORIDE 9 MG/ML
1000 INJECTION, SOLUTION INTRAVENOUS
Refills: 0 | Status: DISCONTINUED | OUTPATIENT
Start: 2022-11-14 | End: 2022-11-14

## 2022-11-14 RX ORDER — LANOLIN
1 OINTMENT (GRAM) TOPICAL EVERY 6 HOURS
Refills: 0 | Status: DISCONTINUED | OUTPATIENT
Start: 2022-11-14 | End: 2022-11-15

## 2022-11-14 RX ORDER — OXYCODONE HYDROCHLORIDE 5 MG/1
5 TABLET ORAL ONCE
Refills: 0 | Status: DISCONTINUED | OUTPATIENT
Start: 2022-11-14 | End: 2022-11-15

## 2022-11-14 RX ORDER — SIMETHICONE 80 MG/1
80 TABLET, CHEWABLE ORAL EVERY 4 HOURS
Refills: 0 | Status: DISCONTINUED | OUTPATIENT
Start: 2022-11-14 | End: 2022-11-15

## 2022-11-14 RX ORDER — BENZOCAINE 10 %
1 GEL (GRAM) MUCOUS MEMBRANE EVERY 6 HOURS
Refills: 0 | Status: DISCONTINUED | OUTPATIENT
Start: 2022-11-14 | End: 2022-11-15

## 2022-11-14 RX ORDER — CITRIC ACID/SODIUM CITRATE 300-500 MG
30 SOLUTION, ORAL ORAL ONCE
Refills: 0 | Status: DISCONTINUED | OUTPATIENT
Start: 2022-11-14 | End: 2022-11-14

## 2022-11-14 RX ORDER — ACETAMINOPHEN 500 MG
975 TABLET ORAL
Refills: 0 | Status: DISCONTINUED | OUTPATIENT
Start: 2022-11-14 | End: 2022-11-15

## 2022-11-14 RX ORDER — OXYTOCIN 10 UNIT/ML
333.33 VIAL (ML) INJECTION
Qty: 20 | Refills: 0 | Status: DISCONTINUED | OUTPATIENT
Start: 2022-11-14 | End: 2022-11-15

## 2022-11-14 RX ORDER — IBUPROFEN 200 MG
600 TABLET ORAL EVERY 6 HOURS
Refills: 0 | Status: DISCONTINUED | OUTPATIENT
Start: 2022-11-14 | End: 2022-11-15

## 2022-11-14 RX ORDER — SODIUM CHLORIDE 9 MG/ML
3 INJECTION INTRAMUSCULAR; INTRAVENOUS; SUBCUTANEOUS EVERY 8 HOURS
Refills: 0 | Status: DISCONTINUED | OUTPATIENT
Start: 2022-11-14 | End: 2022-11-15

## 2022-11-14 RX ORDER — OXYTOCIN 10 UNIT/ML
333.33 VIAL (ML) INJECTION
Qty: 20 | Refills: 0 | Status: COMPLETED | OUTPATIENT
Start: 2022-11-14 | End: 2022-11-14

## 2022-11-14 RX ORDER — IBUPROFEN 200 MG
600 TABLET ORAL EVERY 6 HOURS
Refills: 0 | Status: COMPLETED | OUTPATIENT
Start: 2022-11-14 | End: 2023-10-13

## 2022-11-14 RX ORDER — AER TRAVELER 0.5 G/1
1 SOLUTION RECTAL; TOPICAL EVERY 4 HOURS
Refills: 0 | Status: DISCONTINUED | OUTPATIENT
Start: 2022-11-14 | End: 2022-11-15

## 2022-11-14 RX ORDER — DIPHENHYDRAMINE HCL 50 MG
25 CAPSULE ORAL EVERY 6 HOURS
Refills: 0 | Status: DISCONTINUED | OUTPATIENT
Start: 2022-11-14 | End: 2022-11-15

## 2022-11-14 RX ADMIN — SODIUM CHLORIDE 125 MILLILITER(S): 9 INJECTION, SOLUTION INTRAVENOUS at 06:05

## 2022-11-14 RX ADMIN — Medication 600 MILLIGRAM(S): at 17:45

## 2022-11-14 RX ADMIN — Medication 975 MILLIGRAM(S): at 14:12

## 2022-11-14 RX ADMIN — SODIUM CHLORIDE 125 MILLILITER(S): 9 INJECTION, SOLUTION INTRAVENOUS at 03:56

## 2022-11-14 RX ADMIN — Medication 975 MILLIGRAM(S): at 21:28

## 2022-11-14 RX ADMIN — Medication 1000 MILLIUNIT(S)/MIN: at 06:26

## 2022-11-14 RX ADMIN — SODIUM CHLORIDE 125 MILLILITER(S): 9 INJECTION, SOLUTION INTRAVENOUS at 02:20

## 2022-11-14 RX ADMIN — ETONOGESTREL 68 MILLIGRAM(S): 68 IMPLANT SUBCUTANEOUS at 16:33

## 2022-11-14 RX ADMIN — Medication 30 MILLIGRAM(S): at 06:44

## 2022-11-14 NOTE — DISCHARGE NOTE OB - HOSPITAL COURSE
Normal spontaneous vaginal delivery @ 39w4d, uncomplicated. Postpartum pain is well controlled with PRN medication. She has no difficulty with ambulation, voiding or PO intake. Lab values and vital signs are within normal limits prior to discharge.

## 2022-11-14 NOTE — OB PROVIDER H&P - ASSESSMENT
A/P: 27y  at 39w4d GA admitted in labor  -Admit to L&D  -Consent  -Admission labs  -IV fluids  -Fetus: Cat I tracing. Continuous toco and fetal monitoring.   -GBS: Negative, no GBS ppx required   -Analgesia: prn    Discussed with Dr. Dumont

## 2022-11-14 NOTE — DISCHARGE NOTE OB - PATIENT PORTAL LINK FT
You can access the FollowMyHealth Patient Portal offered by [facility] by registering at the following website: [email or web address]. By joining [facility]’s FollowMyHealth portal, you will also be able to view your health information using other applications (apps) compatible with our system.

## 2022-11-14 NOTE — OB PROVIDER H&P - HISTORY OF PRESENT ILLNESS
27y  at 39w4d GA by LMP consistent with 1st trimester sono who presents to L&D for contractions  that started at 9:30 PM and were every 8-10min. Patient denies vaginal bleeding,  and leakage of fluid. She endorses good fetal movement. Denies fevers, chills, nausea, vomiting, chest pain, SOB, dizziness and headache. No other complaints at this time.     VLAD: 2022  LMP: 2/10/2022    Prenatal course is significant for:  short interval pregnancy   cervical nabothian cyst      POB:  x3 uncomplicated. ,,  PGYN: -fibroids, -ovarian cysts, denies STD hx, denies abnormal PAPs   PMH: Denies  PSH: gallbladder removal  SH: Denies EtOH, tobacco and illicit drug use during this pregnancy; feels safe at home   Meds: PNVs  Allergies: NKDA    BMI: 29.68  Sono: vertex, anterior

## 2022-11-14 NOTE — OB PROVIDER DELIVERY SUMMARY - NSPROVIDERDELIVERYNOTE_OBGYN_ALL_OB_FT
Vaginal Delivery Summary    Procedure: Normal spontaneous vaginal delivery   Findings: Viable male infant delivered in cephalic presentation at 0626, placenta delivered at 0629.  Apgar scores 9/9.   Weight will be recorded after 1 hour to allow for skin-to-skin contact.  Laceration(s): No lacerations   Repair: N/a  EBL: 37  Complications: loose nuchal x1, clinical signs of abruption noted     Procedure:   Patient felt rectal pressure and was found to be fully dilated, +2 station. She pushed effectively for 3 minutes. She delivered a viable male infant. A loose nuchal cord X 1 was reduced on delivery of the shoulders and delayed cord clamping was performed for 30 seconds. Placenta delivered intact and pitocin was started at the delivery of the anterior shoulder. Perineum and vagina were inspected, no lacerations present. Adequate hemostasis was obtained. Vaginal Delivery Summary    Procedure: Normal spontaneous vaginal delivery   Findings: Viable male infant delivered in cephalic presentation at 0626, placenta delivered at 0629.  Apgar scores 9/9.   Weight will be recorded after 1 hour to allow for skin-to-skin contact.  Laceration(s): No lacerations   Repair: N/a  EBL: 37  Complications: loose nuchal x1, clinical signs of abruption noted due to blood fluids and passage of old clots after delivery of placenta     Procedure:   Patient felt rectal pressure and was found to be fully dilated, +2 station. She pushed effectively for 3 minutes. She delivered a viable male infant. A loose nuchal cord X 1 was reduced on delivery of the shoulders and delayed cord clamping was performed for 30 seconds. Placenta delivered intact and pitocin was started at the delivery of the anterior shoulder. Perineum and vagina were inspected, no lacerations present. Adequate hemostasis was obtained.

## 2022-11-14 NOTE — DISCHARGE NOTE OB - CARE PROVIDER_API CALL
Lisbet Conley)  Obstetrics and Gynecology  [address] [place] [address]  Phone: [phone]  Fax: [phone]  Follow Up Time: 2 weeks

## 2022-11-14 NOTE — OB RN DELIVERY SUMMARY - NSSELHIDDEN_OBGYN_ALL_OB_FT
[NS_[email or web address]_ALL_OB_FT:[id number]],[NS_DeliveryAssist1_OBGYN_ALL_OB_FT:[id number]=],[NS_DeliveryRN_OBGYN_ALL_OB_FT:[id number]=]

## 2022-11-14 NOTE — OB PROVIDER DELIVERY SUMMARY - NSSELHIDDEN_OBGYN_ALL_OB_FT
[NS_DeliveryAttending1_OBGYN_ALL_OB_FT:[id number]=],[NS_DeliveryAssist1_OBGYN_ALL_OB_FT:[id number]=]

## 2022-11-14 NOTE — DISCHARGE NOTE OB - DRINK 8 TO 10 GLASSES OF FLUID EACH DAY
"Right buttock pain radiates to right thigh x 2 weeks", no injury or recent change in activity "Right buttock pain radiates to right thigh x 2 weeks", no injury or recent change in activity. did not take any medication for pain. Statement Selected

## 2022-11-14 NOTE — OB RN DELIVERY SUMMARY - NS_SEPSISRSKCALC_OBGYN_ALL_OB_FT
EOS calculated successfully. EOS Risk Factor: 0.5/1000 live births ([facility] national incidence); GA=39w4d; Temp=97.88; ROM=5.233; GBS='Negative'; Antibiotics='No antibiotics or any antibiotics < 2 hrs prior to birth'

## 2022-11-14 NOTE — DISCHARGE NOTE OB - NS MD DC FALL RISK RISK
For information on Fall & Injury Prevention, visit: [email or web address] OR  [email or web address] OR  https://www.cdc.gov/steadi/patient.html

## 2022-11-14 NOTE — OB PROVIDER H&P - ATTENDING COMMENTS
27y  at 39w4d admitted in active labor  largest baby 8lbs   No medical issues   accepts blood transfusion   GBS neg   wants nexplanon PP   plan   expectant management   dvt ppx   pain management

## 2022-11-14 NOTE — OB PROVIDER H&P - NSHPPHYSICALEXAM_GEN_ALL_CORE
T(C): 36.5 (11-13-22 @ 23:30), Max: 36.5 (11-13-22 @ 23:30)  HR: 66 (11-13-22 @ 23:38) (66 - 66)  BP: 105/64 (11-13-22 @ 23:38) (105/64 - 105/64)  RR: 20 (11-13-22 @ 23:30) (20 - 20)      Gen: NAD, well-appearing, AAOx3   Abd: Soft, gravid  Ext: non-tender, non-edematous  SVE: 5/70/-2  Bedside sono: vertex, anterior  FHT: baseline 140, moderate variability, +accels, -decels   [place]: q 6-8min

## 2022-11-14 NOTE — OB PROVIDER LABOR PROGRESS NOTE - NS_EFFACEMANT_OBGYN_ALL_OB_NU
Subjective:   Patient ID:  Drew Farrar is a 49 y.o. male who presents for follow up of Chest Pain; Congestive Heart Failure; and Atrial Fibrillation      HPI:       He is here today complaining of dyspnea and wheezing with moderate exertion.  He has CHF with a recent that revealed the following findings: EF 40%, Severe MR, Moderate TR, PASP 47, and Severe LAE. He has persistent afib with a controlled rate and anticoagulated with coumadin. He has an extensive history of venous insufficiency with ulcers that have finally healed. He is s/p deep venous intervention, multiple EVLTs ad sclerotherapy sessions. He has h/o DVTs and PEs as well. He is compliant with his medications but not with his diet. He is on lisinopril, toprol xl, aldactone, and lasix with /60 mmHg.  He was recently admitted to [facility] (9/2017) with decompensated CHF and chest pain. PET stress was non ischemic. TNI was initially 0.042 then trended to normal. TSH and Free T4 were 0.010 and 1.88. He was seen by endocrinology and adjusted tapazole to 20 mg daily. He has a follow up on mid October for further care.       ECG today: afib with HR 70          Patient Active Problem List    Diagnosis Date Noted    Non-rheumatic mitral regurgitation 09/07/2017    Chronic systolic heart failure 09/06/2017     -  Echo 9/2017   EF 40%    Severe MR   Moderate TR   PASP 47   Severe LAE   LVEDD 7.1 cm    LVESD 5.2 cm          Hyperthyroidism 09/05/2017    Elevated troponin 09/05/2017    Preoperative clearance 07/10/2017    Chronic combined systolic and diastolic congestive heart failure 02/20/2017    Long term (current) use of anticoagulants 10/31/2016    Other pulmonary embolism without acute cor pulmonale, unspecified chronicity 10/28/2016    Acute pulmonary embolism 10/26/2016    Recurrent pulmonary embolism 10/26/2016    History of DVT (deep vein thrombosis) 09/16/2016    Essential hypertension 09/16/2016    Knee pain, right 07/07/2016     Difficulty walking 2016    Muscle weakness 2016    Group A streptococcal infection 2016    Tinea pedis of both feet 2016    Bilateral edema of lower extremity 2016    Morbid obesity 2016    Persistent atrial fibrillation 2016    Right knee pain 2016    NARESH (acute kidney injury) 2016    Chest pain 2014    Depression 2014    Ulcer of ankle 2014    Elevated BP 2014    May-Thurner syndrome 2014     1. Successful IVUS guided intervention for May Thurner Syndrome 2. Left common iliac vein treated with 22 x 70 mm Wallstent overlapping with 22 x 45 mm Wallstent post dilation 18 x 40 mm balloon              Stenosis of right iliac vein 2014     S/p venoplasty with  20 x 80 mm Wallstent post dilated with 14 mm balloon in 2014      Chronic venous hypertension with ulcer 2013    Edema 2013     R leg      Venous (peripheral) insufficiency 2013     S/p bilateral iliac vein stents    S/p R GSV EVLT with laser 10/2013    S/p US guided accessory vein sclerotherapy of R calf       Obesity, morbid 2012    Varicose ulcer 2012    Ulcer - lesion 2012     Of left foot        DVT (deep venous thrombosis) 2012    Chronic atrial fibrillation 2012           Right Arm BP - Sittin/63  Left Arm BP - Sittin/64        LABS    LAST HbA1c  Lab Results   Component Value Date    HGBA1C 5.1 07/10/2017       Lipid panel  Lab Results   Component Value Date    CHOL 124 07/10/2017    CHOL 131 2016    CHOL 123 2012     Lab Results   Component Value Date    HDL 23 (L) 07/10/2017    HDL 29 (L) 2016    HDL 32 (L) 2012     Lab Results   Component Value Date    LDLCALC 70.4 07/10/2017    LDLCALC 83.0 2016    LDLCALC 75.4 2012     Lab Results   Component Value Date    TRIG 153 (H) 07/10/2017    TRIG 95 2016    TRIG 78 2012     Lab Results    Component Value Date    CHOLHDL 18.5 (L) 07/10/2017    CHOLHDL 22.1 11/21/2016    CHOLHDL 26.0 08/25/2012            Review of Systems   Constitution: Positive for weight loss. Negative for diaphoresis, weakness, night sweats and weight gain.   HENT: Negative for congestion.    Eyes: Negative for blurred vision, discharge and double vision.   Cardiovascular: Positive for dyspnea on exertion. Negative for chest pain, claudication, cyanosis, irregular heartbeat, leg swelling, near-syncope, orthopnea, palpitations, paroxysmal nocturnal dyspnea and syncope.   Respiratory: Positive for wheezing (with activities). Negative for cough and shortness of breath.    Endocrine: Negative for cold intolerance, heat intolerance and polyphagia.   Hematologic/Lymphatic: Negative for adenopathy and bleeding problem. Does not bruise/bleed easily.   Skin: Positive for poor wound healing (healed). Negative for dry skin and nail changes.   Musculoskeletal: Positive for arthritis. Negative for back pain, falls, joint pain, myalgias and neck pain.   Gastrointestinal: Negative for bloating, abdominal pain, change in bowel habit and constipation.   Genitourinary: Negative for bladder incontinence, dysuria, flank pain, genital sores and missed menses.   Neurological: Negative for aphonia, brief paralysis, difficulty with concentration and dizziness.   Psychiatric/Behavioral: Negative for altered mental status and memory loss. The patient does not have insomnia.    Allergic/Immunologic: Negative for environmental allergies.       Objective:   Physical Exam   Constitutional: He is oriented to person, place, and time. He appears well-developed and well-nourished. He is not intubated.   HENT:   Head: Normocephalic and atraumatic.   Right Ear: External ear normal.   Left Ear: External ear normal.   Mouth/Throat: Oropharynx is clear and moist.   Eyes: Conjunctivae and EOM are normal. Pupils are equal, round, and reactive to light. Right eye  exhibits no discharge. Left eye exhibits no discharge. No scleral icterus.   Neck: Normal range of motion. Neck supple. Normal carotid pulses, no hepatojugular reflux and no JVD present. Carotid bruit is not present. No tracheal deviation present. No thyromegaly present.   Cardiovascular: Normal rate, S1 normal and S2 normal.  An irregular rhythm present.  No extrasystoles are present. PMI is not displaced.  Exam reveals no gallop, no S3, no distant heart sounds, no friction rub and no midsystolic click.    No murmur heard.  Pulses:       Carotid pulses are 2+ on the right side, and 2+ on the left side.       Radial pulses are 2+ on the right side, and 2+ on the left side.        Femoral pulses are 2+ on the right side, and 2+ on the left side.       Popliteal pulses are 2+ on the right side, and 2+ on the left side.        Dorsalis pedis pulses are 2+ on the right side, and 2+ on the left side.        Posterior tibial pulses are 2+ on the right side, and 2+ on the left side.   Pulmonary/Chest: Effort normal and breath sounds normal. No accessory muscle usage or stridor. No apnea, no tachypnea and no bradypnea. He is not intubated. No respiratory distress. He has no decreased breath sounds. He has no wheezes. He has no rales. He exhibits no tenderness and no bony tenderness.   Abdominal: He exhibits no distension, no pulsatile liver, no abdominal bruit, no ascites, no pulsatile midline mass and no mass. There is no tenderness. There is no rebound and no guarding.   Musculoskeletal: Normal range of motion. He exhibits no edema or tenderness.   Lymphadenopathy:     He has no cervical adenopathy.       Chronic lymphedema of R leg/calf area   Neurological: He is alert and oriented to person, place, and time. He has normal reflexes. No cranial nerve deficit. Coordination normal.   Skin: Skin is warm. No rash noted. No erythema. No pallor.   Healed venous uclers    Varicose veins   Psychiatric: He has a normal mood and  affect. His behavior is normal. Judgment and thought content normal.       Assessment:     1. Persistent atrial fibrillation    2. Chronic systolic heart failure    3. May-Thurner syndrome    4. Venous (peripheral) insufficiency    5. Stenosis of right iliac vein    6. History of DVT (deep vein thrombosis)    7. Morbid obesity        Plan:       Exercise  Low salt diet  Daily weight   Take an extra dose of lasix for 3 lbs weight gain        Continue with CHF clinic recommendations  Maintain relationship with [facility]  Follow up with endocrine for treatment of thyroid disorder        Multidisciplinary discuss regarding his valvular disease   I suspect he has functional MR due LV dilatation   Repeat echo in 3 months                Return sooner for concerns or questions. If symptoms persist go to the ED  I have reviewed all pertinent data on this patient       I have reviewed the patient's medical history in detail and updated the computerized patient record.    Orders Placed This Encounter   Procedures    EKG 12-lead       Follow up as scheduled. Return sooner for concerns or questions            He expressed verbal understanding and agreed with the plan        Patient's Medications   New Prescriptions    No medications on file   Previous Medications    FUROSEMIDE (LASIX) 20 MG TABLET    Take 1 tablet (20 mg total) by mouth once daily.    LISINOPRIL 10 MG TAB    Take 1 tablet (10 mg total) by mouth once daily.    METHIMAZOLE (TAPAZOLE) 10 MG TAB    Take 2 tablets (20 mg total) by mouth once daily.    METOPROLOL SUCCINATE (TOPROL-XL) 100 MG 24 HR TABLET    Take 1 tablet (100 mg total) by mouth once daily.    POTASSIUM CHLORIDE SA (K-DUR,KLOR-CON) 10 MEQ TABLET    Take 1 tablet (10 mEq total) by mouth once daily.    ROSUVASTATIN (CRESTOR) 10 MG TABLET    Take 1 tablet (10 mg total) by mouth every evening.    SPIRONOLACTONE (ALDACTONE) 25 MG TABLET    Take 1 tablet (25 mg total) by mouth once daily.    WARFARIN  (COUMADIN) 10 MG TABLET    Take 1.5-2 tablets (15-20 mg total) by mouth once daily.   Modified Medications    No medications on file   Discontinued Medications    No medications on file       90

## 2022-11-15 VITALS
HEART RATE: 65 BPM | TEMPERATURE: 98 F | OXYGEN SATURATION: 98 % | SYSTOLIC BLOOD PRESSURE: 102 MMHG | RESPIRATION RATE: 16 BRPM | DIASTOLIC BLOOD PRESSURE: 61 MMHG

## 2022-11-15 LAB
HCT VFR BLD CALC: 30.2 % — LOW (ref 34.5–45)
HGB BLD-MCNC: 9.9 G/DL — LOW (ref 11.5–15.5)

## 2022-11-15 PROCEDURE — 86780 TREPONEMA PALLIDUM: CPT

## 2022-11-15 PROCEDURE — 59050 FETAL MONITOR W/REPORT: CPT

## 2022-11-15 PROCEDURE — 59025 FETAL NON-STRESS TEST: CPT

## 2022-11-15 PROCEDURE — 85018 HEMOGLOBIN: CPT

## 2022-11-15 PROCEDURE — U0005: CPT

## 2022-11-15 PROCEDURE — G0463: CPT

## 2022-11-15 PROCEDURE — 86769 SARS-COV-2 COVID-19 ANTIBODY: CPT

## 2022-11-15 PROCEDURE — 85025 COMPLETE CBC W/AUTO DIFF WBC: CPT

## 2022-11-15 PROCEDURE — U0003: CPT

## 2022-11-15 PROCEDURE — 85014 HEMATOCRIT: CPT

## 2022-11-15 PROCEDURE — 86900 BLOOD TYPING SEROLOGIC ABO: CPT

## 2022-11-15 PROCEDURE — 86901 BLOOD TYPING SEROLOGIC RH(D): CPT

## 2022-11-15 PROCEDURE — 86850 RBC ANTIBODY SCREEN: CPT

## 2022-11-15 PROCEDURE — 36415 COLL VENOUS BLD VENIPUNCTURE: CPT

## 2022-11-15 PROCEDURE — 88307 TISSUE EXAM BY PATHOLOGIST: CPT

## 2022-11-15 RX ORDER — ACETAMINOPHEN 500 MG
2 TABLET ORAL
Qty: 56 | Refills: 0
Start: 2022-11-15 | End: 2022-11-21

## 2022-11-15 RX ORDER — IBUPROFEN 200 MG
1 TABLET ORAL
Qty: 28 | Refills: 0
Start: 2022-11-15 | End: 2022-11-21

## 2022-11-15 RX ORDER — TETANUS TOXOID, REDUCED DIPHTHERIA TOXOID AND ACELLULAR PERTUSSIS VACCINE, ADSORBED 5; 2.5; 8; 8; 2.5 [IU]/.5ML; [IU]/.5ML; UG/.5ML; UG/.5ML; UG/.5ML
0.5 SUSPENSION INTRAMUSCULAR ONCE
Refills: 0 | Status: COMPLETED | OUTPATIENT
Start: 2022-11-15 | End: 2022-11-15

## 2022-11-15 RX ADMIN — Medication 1 TABLET(S): at 11:39

## 2022-11-15 RX ADMIN — Medication 975 MILLIGRAM(S): at 08:30

## 2022-11-15 RX ADMIN — Medication 600 MILLIGRAM(S): at 11:38

## 2022-11-15 RX ADMIN — Medication 975 MILLIGRAM(S): at 02:15

## 2022-11-15 NOTE — PROGRESS NOTE ADULT - ASSESSMENT
QUEENIE ANDRADE is a 27y  now PPD#1 s/p spontaneous vaginal delivery at 39w4d gestation, uncomplicated.    A/P:    -Vital signs stable  -Hgb:10.4>9.9  -Voiding, tolerating PO, bowel function nml   -Advance care as tolerated   -Continue routine postpartum care and education  -Healthy male infant, declines circumcision  - Nexplanon  placed  -Dispo: Pt is stable, doing well and meeting all postpartum and postoperative milestones. Possible discharge to home today pending attending approval.    Will d/w Dr. East

## 2022-11-15 NOTE — PROGRESS NOTE ADULT - SUBJECTIVE AND OBJECTIVE BOX
INTERVAL HPI/OVERNIGHT EVENTS:  27y Female s/p labor epidural, CSE, on 11/14/2022     Vital Signs Last 24 Hrs  T(C): 36.8 (15 Nov 2022 04:48), Max: 37.2 (14 Nov 2022 16:50)  T(F): 98.3 (15 Nov 2022 04:48), Max: 98.9 (14 Nov 2022 16:50)  HR: 65 (15 Nov 2022 04:48) (64 - 73)  BP: 102/61 (15 Nov 2022 04:48) (102/61 - 109/70)  BP(mean): --  RR: 16 (15 Nov 2022 04:48) (16 - 16)  SpO2: 98% (15 Nov 2022 04:48) (98% - 99%)    Parameters below as of 15 Nov 2022 04:48  Patient On (Oxygen Delivery Method): room air            Patient's overall anesthesia satisfaction: Positive    Patient seen and doing well     No headache      No residual numbness or weakness, sensory and motor function intact    Site examined Clean and Dry    No anesthetic complications or complaints noted or reported                 
S: Patient doing well. Minimal lochia. No complaints.    O: Vital Signs Last 24 Hrs  T(C): 36.8 (15 Nov 2022 04:48), Max: 37.2 (14 Nov 2022 16:50)  T(F): 98.3 (15 Nov 2022 04:48), Max: 98.9 (14 Nov 2022 16:50)  HR: 65 (15 Nov 2022 04:48) (55 - 83)  BP: 102/61 (15 Nov 2022 04:48) (97/54 - 125/58)  BP(mean): --  RR: 16 (15 Nov 2022 04:48) (16 - 18)  SpO2: 98% (15 Nov 2022 04:48) (98% - 100%)    Parameters below as of 15 Nov 2022 04:48  Patient On (Oxygen Delivery Method): room air        Gen: NAD  Abd: soft, NT, ND, fundus firm below umbilicus  Ext: no tenderness    Labs:                        9.9    x     )-----------( x        ( 15 Nov 2022 04:35 )             30.2          A/P PP # 1  Doing well.  Routine post partum care.  Discharge home today in satisfactory condition.      
QUEENIE ANDRADE is a 27y  now PPD#1 s/p spontaneous vaginal delivery at 39w4d gestation, uncomplicated.    S:    No acute events overnight.   The patient has no complaints.  Pain controlled with current treatment regimen.   She is ambulating without difficulty and tolerating PO.   + flatus/-BM/+ voiding   She endorses appropriate lochia, which is decreasing.   She is breastfeeding without difficulty.   She denies fevers, chills, nausea and vomiting.   She denies lightheadedness, dizziness, palpitations, chest pain and SOB.     O:    T(C): 36.8 (11-15-22 @ 04:48), Max: 37.2 (22 @ 16:50)  HR: 65 (11-15-22 @ 04:48) (55 - 83)  BP: 102/61 (11-15-22 @ 04:48) (97/54 - 125/58)  RR: 16 (11-15-22 @ 04:48) (16 - 18)  SpO2: 98% (11-15-22 @ 04:48) (98% - 99%)    Gen: NAD, AOx3  Breast: Nontender, non-engorged   Abdomen:  Soft, non-tender, non-distended  Uterus:  Fundus firm below umbilicus  VE:  Expectant lochia  Ext:  Non-tender and non-edematous                          9.9    x     )-----------( x        ( 15 Nov 2022 04:35 )             30.2

## 2022-12-02 LAB — SURGICAL PATHOLOGY STUDY: SIGNIFICANT CHANGE UP

## 2023-03-08 NOTE — OB PROVIDER H&P - NS_GESTAGE_OBGYN_ALL_OB_FT
39w4q No Repair - Repaired With Adjacent Surgical Defect Text (Leave Blank If You Do Not Want): After obtaining clear surgical margins the defect was repaired concurrently with another surgical defect which was in close approximation.

## 2023-11-10 NOTE — OB PROVIDER DELIVERY SUMMARY - NSVAGINALEXAMCERT_OBGYN_ALL_OB
The Delivery OB Provider certifies that vaginal examination and/or abdominal examination after the delivery was done and no foreign body was found.
Left arm;

## 2024-08-06 NOTE — OB PROVIDER H&P - NS_ADMITREASON_OBGYN_ALL_OB
Return [facility] Prenatal Visit    Name: Bettie Munguia  Date: 24    High Risk Issues  Maternal CHD; Coarctation of the aorta and Bicuspid aortic valve with aortic root dilation of 4.0 cm and narrowing of descending aortic root  - s/p repair at 8 months of age  - Metoprolol 50mg QD increased on 2024 for borderline HTN by [facility]  - Follows with [facility] Dr. Hutchinson    [facility]  Bettie is a 31 year old  who presents today for return prenatal visit.  She is 35w5d today.    Pt is feeling well.  Denies danger signs, LOF, vaginal bleeding and no frequent UCs.  + FM.    Denies chest pain, palpitations, dizziness, or edema.     Review of Systems  All other systems negative unless noted in the HPI    Objective   Visit Vitals  /82 (BP Location: LUE - Left upper extremity, Patient Position: Sitting, Cuff Size: Regular)   Pulse (!) 108   Resp 18   Ht 5' 5\" (1.651 m)   Wt 106.1 kg (234 lb)   LMP 2023 (Exact Date) Comment: sure of LMP   SpO2 98%   BMI 38.94 kg/m²     Physical exam  General: Alert and oriented, pleasant  Psychiatric: interactive and mood/affect were appropriate  Skin: no rashes  Pelvic Exam: deferred    Fetal heart rate: pending on NST    Results:  Prenatal Labs  Blood type ABO/RH(D) (no units)   Date Value   2024 B Rh Positive      Antibody screen ANTIBODY SCREEN (no units)   Date Value   2024 Negative      CBC HGB (g/dL)   Date Value   2024 11.5 (L)     PLT (K/mcL)   Date Value   2024 232      HIV HIV Antigen/ Antibody Combo Screen (no units)   Date Value   2024 Nonreactive       RPR RPR (no units)   Date Value   2024 Nonreactive      Hep B Hepatitis B Surface Antigen (no units)   Date Value   2024 Negative       Rubella Rubella Antibody, IgG (Units/mL)   Date Value   2024 75.2       GC/CT    Chlamydia trachomatis by Nucleic Acid Amplification (no units)   Date Value   2024 Negative     Neisseria gonorrhoeae by Nucleic Acid Amplification (no  units)   Date Value   02/19/2024 Negative         Third Trimester Labs    Genetic/Aneuploidy Testing  Low risk cfDNA  Carrier Screen negative for SMA/CF  MSAFP screen negative    Maternal Imaging  TTE 3/13/24:  Normal left ventricular size and systolic function with mild concentric LVH.  LVEF 65% by Bryant's biplane.  IVSD 1.3 cm, LVEDD 4.4 cm, PWD 1.2 cm.  GLS -21%.  Normal tissue Doppler and diastolic function.  Bicuspid aortic valve with fusion of the right and left cusp without stenosis or regurgitation. There is mild flow acceleration without obstruction starting at the LVOT area.   Normal function of the mitral valve.  Normal left atrial size.  The aortic root measures 3.6 cm at the sinuses of Valsalva.  The proximal ascending aorta measures 3.9 cm.  The aortic arch shows flow acceleration on color Doppler at the isthmus with a narrow segment at the prior repair site that measures 8 mm in diameter.  Normal velocity of the proximal descending aorta prior to coarctation site repair.  Inadequate Doppler of the descending aorta post coarctation site. The abdominal aorta shows a mild diastolic tail on Doppler pattern.  Normal right ventricular size and systolic function.  TAPSE 3.1 cm.  Trivial tricuspid valve regurgitation is inadequate for RVSP estimation.  Normal function of the pulmonary valve without stenosis or regurgitation.  No pericardial effusion.      As compared to the prior echocardiogram on 05/06/2022, there are no significant changes.    MRI/MRA 03/18/24  1.   Normal left ventricular size and function (LVEF 71%), without evidence  of hypertrophy.  2.   Bicuspid aortic valve with right and left cusp fusion, without  stenosis or regurgitation.  3.   Patent coarctation site repair without residual narrowing. Small  proximal descending aorta. Differential flow between proximal descending  and distal descending aorta does not suggest significant collateral burden.  4.   Mildly dilated proximal ascending  aorta with maximum diameter of 40  mm.  5.   Compared to chest MRA from 2022 there are no significant changes in  aortic diameter.    TTE 6/5/24  Normal left ventricular size and systolic function with mild concentric LVH.  LVEF 60-65% by visual assessment.  IVSD 1.2 cm, LVEDD 4.3 cm, PWD 0.9 cm. Normal tissue Doppler and diastolic function.  Bicuspid aortic valve with fusion of the right and left cusp without stenosis or regurgitation. There is mild flow acceleration without obstruction starting at the LVOT area.   Normal function of the mitral valve.  Normal left atrial size.  The aortic root measures 3.7 cm at the sinuses of Valsalva, 3.7 cm at the ST junction. The proximal ascending aorta measures 4.0 cm.  The aortic arch shows flow acceleration on color Doppler at the isthmus with a long narrow segment at the prior repair site and proximal descending aorta that measures 8 mm in diameter.  Normal velocity of descending aorta, PG 13 mmHg, MG 6 mmHg.   Normal right ventricular size and systolic function.  TAPSE 2.5 cm.  Trivial tricuspid valve regurgitation is inadequate for RVSP estimation.  Normal function of the pulmonary valve without stenosis or regurgitation.  No pericardial effusion.      As compared to the prior echocardiogram on 03/13/2024, there are no significant changes.    TTE 7/10/2024  SUMMARY:  Normal left ventricular size and systolic function with mild concentric LVH.  LVEF 60-65% by visual assessment.  IVSD 1.2 cm, LVEDD 3.9 cm, PWD 1.0 cm. Normal tissue Doppler and diastolic function.  Bicuspid aortic valve with fusion of the right and left cusp without stenosis or regurgitation. There is mild flow acceleration without obstruction starting at the LVOT area.   Normal function of the mitral valve.  Normal left atrial size.  The aortic root measures 3.7 cm at the sinuses of Valsalva, 3.7 cm at the ST junction. The proximal ascending aorta measures 4.1 cm.  The aortic arch shows flow acceleration  on color Doppler at the isthmus with a long narrow segment at the prior repair site and proximal descending aorta that measures 8 mm in diameter.  Normal velocity of descending aorta, PG 12 mmHg, MG 6 mmHg.   Normal right ventricular size and systolic function.   Trivial tricuspid valve regurgitation is inadequate for RVSP estimation.  Normal function of the pulmonary valve without stenosis or regurgitation.  No pericardial effusion.     EKG 07/10/2024  Normal sinus rhythm  Normal ECG    Ultrasound   2024 (20+2wga):  g (21%).  MVP 6.82 cm.  Breech presentation.  Anterior placenta without evidence of previa.  Eccentric insertion of a three-vessel cord.  CL = 4 cm.  Fetal anatomy today is complete and no abnormality seen.    2024 (24+2wga):  g (34%; AC 74%).  FAYE 22.9 cm.  Cephalic presentation.  Anterior placenta.  Fetal growth is appropriate.    24 (28+4wga): EFW 1346g (57%; AC 74%). Anterior placenta. Cephalic presentation. MVP 7.2 cm. Fetal growth is appropriate.     24 EFW 2209g (73rd %ile), MVP 6.9, breech    Fetal Cardiac Imaging  2024: Fetal Echo WN.  Recommendation are as follows:  A Followup fetal echocardiogram is recommended at approximately 28-30 weeks gestation.  A post-sherri transthoracic echocardiogram is recommended prior to hospital discharge.  Currently planning to deliver at [facility] in [place], no change to timing, location, or modality from a fetal cardiovascular standpoint at this time.    2024: Fetal Echo  Increased ductal velocity (peak 1.4m/sec) at the distal ductus arteriosus near the aortic insertion without evidence of tortuosity. Normal right-to-left shunt. Normal pulsatility index of ductus arteriosus of 2.44 (mild constriction 1.25-1.9, significant constriction <1.25).     7/10/2024: Fetal echo  Structurally normal fetal heart with normal biventricular size, wall thickness, and systolic function.  Normal sinus rhythm throughout  exam.  As with any fetal echocardiogram, minor valve abnormalities, ventricular septal defects, coarctation of the aorta, and partial anomalous pulmonary venous return cannot absolutely be excluded.     Testing  NST reactive.     Discussion   -- Patient to continue twice weekly  testing until delivery.  -- Discussed delivery process and skin to skin time with baby after delivery, which she desires if possible  -- Anesthesia reviewed patient's chart and stated delivery on L&D and with epidural anesthesia without cards anesthesia is appropriate  - Will confirm with Dr. Hutchinson possible need for admission day prior to  based on cardiology/anesthesiology needs.  - Reviewed signs and symptoms of labor as well as what contractions feel like.  Patient of low threshold to come to OB triage for evaluation if concerned about ongoing contractions.  -- Aware of warning signs of when to go to [facility] for evaluation including SOB, chest pain, or abnormal edema.  - Due to patient's distance from the hospital, will attempt to schedule  testing at [facility] office when it is not bundled with other growth ultrasounds or appointments.   -- Reviewed danger signs, fetal movement that is appropriate for GA,  labor/PPROM precautions and when to call Provider or go to ER/OB Triage    Impression/Problem List  Bettie Munguia is a 31 year old  at 35w5d. Her pregnancy is complicated by:     Patient Active Problem List   Diagnosis    Coarctation of thoracic aorta (CMD)    Bicuspid aortic valve (CMD)    Class 1 obesity due to excess calories without serious comorbidity with body mass index (BMI) of 34.0 to 34.9 in adult    Obesity in pregnancy (CMD)    Supervision of high risk pregnancy, antepartum (CMD)       Maternal CHD; Coarctation of the aorta and Bicuspid aortic valve  - Followed by [facility] (Dr. Hutchinson).  Last visit 07/10/2024; Next to be scheduled PP  - Metoprolol 50mg QD started  4/10/2024 for borderline HTN   - Recommendations as of 07/10/2024:  - it was discussed that if wishing to pursue a vaginal delivery which is not contraindicated in her case it would be reasonable to pursue an assisted second stage delivery to avoid prolonged Valsalva maneuver and significant maintained shifts in blood pressure.   \"Plan:   Continue metoprolol 50 mg daily for BP control  Continue ASA 81 mg daily for pre-eclampsia prevention, can discontinue after delivery as no other cardiac indication for long term use   Continue home BP monitoring   Repeat NTproBNP with next lab draws (ordered)   No cardiac contraindication to mode of delivery, if pursuing vaginal delivery recommend assisted second stage   Blood pressure monitoring in right arm   Continue regular [facility] care \"  - Fetal Echo completed normal  - Met with anesthesia with plan for delivery on L&D and no cards anesthesia  - BP control with goal < 130/80    Supervision of high risk pregnancy  - Dating: by LMP c/w 12+1 wk sono --> 2024  - PNL: UTD  [x] 2nd tri AFP screen negative  [x ] 1h GTT/CBC 24-28 weeks (abnormal 1h; normal 3h)  [ ] HIV/RPR 3rd TM ordered   [ ] GBS 35-36 weeks  Anatomy Scan: Level II completed within normal limits on   - Vaccinations:  [-] Flu declines  [x] TDAP > 27 weeks  [-] COVID initial series x 2.  Declines booster    Next visit: 2 weeks with MD   testing: Continue twice weekly for Maternal CHD and borderline HTN on meds (attempting to schedule majority at [facility] office due to patient's distance from [facility]).   Serial ultrasounds: Growth ultrasounds every 4 weeks for Maternal CHD and borderline HTN on meds  Labs today: GBS collected  Delivery plan: Plan for primary csection on  12pm with Dr. Callahan  Contraception: Condoms.  Feeding: Breast; pump ordered 7/3     Lolly Callahan MD  Maternal-Fetal Medicine Attending    Future Appointments   Date Time Provider Department Center   2024  2:15 PM  [facility] NST BIO [id number] [facility]   8/13/2024  8:30 AM [facility] NURSE [facility]   8/15/2024  7:00 AM [facility] NST BIO [id number] [facility]   8/15/2024  8:00 AM [facility] ULTRASOUND ROOM A [id number] [facility]   8/15/2024  8:30 AM Lolly Callahan MD [id number] [facility]   8/19/2024  9:15 AM [facility] NST BIO [id number] [facility]   8/19/2024 10:00 AM Lolly Callahan MD [id number] [facility]            Labor at Term